# Patient Record
Sex: FEMALE | Race: WHITE | NOT HISPANIC OR LATINO | Employment: OTHER | URBAN - METROPOLITAN AREA
[De-identification: names, ages, dates, MRNs, and addresses within clinical notes are randomized per-mention and may not be internally consistent; named-entity substitution may affect disease eponyms.]

---

## 2017-03-29 ENCOUNTER — TRANSCRIBE ORDERS (OUTPATIENT)
Dept: PHYSICAL THERAPY | Facility: HOSPITAL | Age: 57
End: 2017-03-29

## 2017-03-29 DIAGNOSIS — M17.12 OSTEOARTHRITIS OF LEFT KNEE, UNSPECIFIED OSTEOARTHRITIS TYPE: Primary | ICD-10-CM

## 2017-04-04 ENCOUNTER — HOSPITAL ENCOUNTER (OUTPATIENT)
Dept: PHYSICAL THERAPY | Facility: HOSPITAL | Age: 57
Setting detail: THERAPIES SERIES
Discharge: HOME | End: 2017-04-04
Attending: SPECIALIST

## 2017-04-04 DIAGNOSIS — M17.12 OSTEOARTHRITIS OF LEFT KNEE, UNSPECIFIED OSTEOARTHRITIS TYPE: Primary | ICD-10-CM

## 2017-04-04 PROCEDURE — G8978 MOBILITY CURRENT STATUS: HCPCS | Performed by: PHYSICAL THERAPIST

## 2017-04-04 PROCEDURE — 97110 THERAPEUTIC EXERCISES: CPT | Performed by: PHYSICAL THERAPIST

## 2017-04-04 PROCEDURE — G8979 MOBILITY GOAL STATUS: HCPCS | Performed by: PHYSICAL THERAPIST

## 2017-04-04 PROCEDURE — 97161 PT EVAL LOW COMPLEX 20 MIN: CPT | Performed by: PHYSICAL THERAPIST

## 2017-04-04 NOTE — PROGRESS NOTES
Outpatient Physical Therapy Ortho Initial Evaluation  [facility]     Patient Name: Christie Hollis  : 1960  MRN: [id number]  Today's Date: 2017      Visit Date: 2017    There is no problem list on file for this patient.       History reviewed. No pertinent past medical history.     History reviewed. No pertinent surgical history.    Visit Dx:     ICD-10-CM ICD-9-CM   1. Osteoarthritis of left knee, unspecified osteoarthritis type M17.9 715.96             Patient History       17 1300          History    Chief Complaint Pain;Muscle tenderness  -LC      Type of Pain Knee pain   LEFT  -      Date Current Problem(s) Began 17  -      Brief Description of Current Complaint Pt reports that 6 weeks ago she was working out at the gym and began having knee pain. She then walked home and her pain increased. She reports sore achey pain in her Left lateral and posterior knee that has somewhat relieved, but continues to persist. She recently received a cortizone shot, but this had no change on her pain. Pt reports crepitus when standing from chair.  -LC      Previous treatment for THIS PROBLEM Injections  -LC      Onset Date- PT 17  -      Patient/Caregiver Goals Relieve pain;Return to prior level of function;Improve mobility;Improve strength  -LC      Occupation/sports/leisure activities works out at gym  -      What clinical tests have you had for this problem? X-ray  -      Results of Clinical Tests OA in LEFT knee  -LC      Pain     Pain Location Knee   LEFT; lateral and posterior joint line  -LC      Pain at Present 2  -LC      Pain at Best 2  -LC      Pain at Worst 8  -LC      Pain Frequency Constant/continuous  -LC      Pain Description Aching;Sore  -LC      What Performance Factors Make the Current Problem(s) WORSE? prolonged standing, walking  -      What Performance Factors Make the Current Problem(s) BETTER? ice  -LC      Difficulties with recreational activities?  working out at gym  -LC      Fall Risk Assessment    Any falls in the past year: No  -LC      Daily Activities    Primary Language English  -LC      Pt Participated in POC and Goals Yes  -LC      Safety    Are you being hurt, hit, or frightened by anyone at home or in your life? No  -LC      Are you being neglected by a caregiver No  -LC        User Key  (r) = Recorded By, (t) = Taken By, (c) = Cosigned By    Initials Name Provider Type    MARCELINO Florentino, PT DPT Physical Therapist                PT Ortho       04/04/17 1300    Posture/Observations    Posture- WNL Posture is WNL  -LC    Sensation    Sensation WNL? WNL  -LC    Special Tests/Palpation    Special Tests/Palpation Hip  -LC    Knee Special Tests    Lachman’s (ACL lesion) Left:;Negative  -LC    Valgus stress (MCL lesion) Left:;Negative  -LC    Varus stress (LCL lesion) Left:;Negative  -LC    Patellar grind test (chondromalacia patella) Left:;Negative  -LC    ROM (Range of Motion)    General ROM Detail 90/90 test positive  -LC    Left Knee    Extension/Flexion ROM Details 0-105; painful  -LC    Right Knee    Extension/Flexion ROM Details 0-120  -LC    Left Hip    Hip Flexion Gross Movement (4-/5) good minus  -LC    Hip ABduction Gross Movement (4-/5) good minus  -LC    Hip Abduction Gluteus Minimus (4-/5) good minus  -LC    Right Hip    Hip Flexion Gross Movement (4+/5) good plus  -LC    Hip ABduction Gross Movement (4+/5) good plus  -LC    Hip ADduction Gross Movement (4+/5) good plus  -LC    Left Knee    Knee Extension Gross Movement (4-/5) good minus  -LC    Knee Flexion Gross Movement (4-/5) good minus  -LC    Right Knee    Knee Extension Gross Movement (4+/5) good plus  -LC    Knee Flexion Gross Movement (4+/5) good plus  -LC      User Key  (r) = Recorded By, (t) = Taken By, (c) = Cosigned By    Initials Name Provider Type    MARCELINO Florentino, PT TAHMINAT Physical Therapist                            Therapy Education       04/04/17 1424          Therapy  Education    Given HEP;Symptoms/condition management;Pain management  -      Program New  -      How Provided Verbal;Demonstration;Written  -      Provided to Patient  -      Level of Understanding Teach back education performed;Verbalized;Demonstrated  -        User Key  (r) = Recorded By, (t) = Taken By, (c) = Cosigned By    Initials Name Provider Type    MARCELINO Florentino, PT DPT Physical Therapist                PT OP Goals       04/04/17 1300       PT Short Term Goals    STG 1 Pt will increase Knee Outcome Survey 20% to increase independence and performance of ADL's  -     STG 1 Progress New  -LC     STG 2 Patient will increase knee strength to 4+/5 to improve functional mobility  -     STG 2 Progress New  -     STG 3 Patient will report decreased pain rating on VAS from  5/10 to  2/10 with ADLs and household activities.  -     STG 3 Progress New  -     Long Term Goals    LTG 1 Pt will increase Knee Outcome Survey 40% to increase independence and performance of ADL's  -     LTG 1 Progress New  -     LTG 2 Patient will demonstrate an independent HEP for core and knee strength and flexibility/ROM.  -     LTG 2 Progress New  -LC     LTG 3 Pt will return to workout with reported pain 0/10.  -     LTG 3 Progress New  -     Time Calculation    PT Goal Re-Cert Due Date 05/04/17  -       User Key  (r) = Recorded By, (t) = Taken By, (c) = Cosigned By    Initials Name Provider Type    MARCELINO Florentino, PT DPT Physical Therapist                PT Assessment/Plan       04/04/17 1423       PT Assessment    Functional Limitations Performance in sport activities;Impaired gait;Performance in leisure activities  -     Impairments Gait;Pain;Joint mobility;Muscle strength;Range of motion  -     Assessment Comments Pt is a 57 y.o. female who reports to PT with acute L knee pain at the lateral and posterior joint line. Pt has dx of Left knee OA. She has a positive 90/90 test, a negative Lachman,  a negative Valgus and varus stress test. Pt presents with point tenderness over insertion of biceps femoris in posterior knee. Pt appears to have acute biceps femoris strain 6 weeks ago at gym and this has not resolved since and presents as tendinitis at this time. Pt will benefit from strengthening and stretching of hamstring and glut musculature to allow for pain reduction and return to gym workouts pain free.  -     Please refer to paper survey for additional self-reported information Yes  -     Rehab Potential Good  -     Patient would benefit from skilled therapy intervention Yes  -LC     PT Plan    PT Frequency 1x/week  -     Predicted Duration of Therapy Intervention (days/wks) 4 weeks  -     Planned CPT's? PT EVAL LOW COMPLEXITY: 07460;PT THER PROC EA 15 MIN: 89213;PT THER ACT EA 15 MIN: 77814;PT MANUAL THERAPY EA 15 MIN: 53441;PT GAIT TRAINING EA 15 MIN: 53369;PT ULTRASOUND EA 15 MIN: 68716  -     Physical Therapy Interventions (Optional Details) home exercise program;modalities;manual therapy techniques;ROM (Range of Motion);strengthening;stretching;patient/family education  -     PT Plan Comments Pt requires skilled therapy to decrease acute knee pain and allow return to sport. Pt requires skilled therapy including therex, manual, neuromuscular re-ed, and ultrasound for pain reduction and strengthening.  -       User Key  (r) = Recorded By, (t) = Taken By, (c) = Cosigned By    Initials Name Provider Type    MARCELINO Florentino PT DPTHOR Physical Therapist                  Exercises       04/04/17 1400          Exercise 1    Exercise Name 1 see exercise flowsheet  -        User Key  (r) = Recorded By, (t) = Taken By, (c) = Cosigned By    Initials Name Provider Type    MARCELINO Florentino PT DPT Physical Therapist                              Outcome Measures       04/04/17 1300          Knee Outcome Score    Knee Outcome Score Comments 55%   100% no deficits  -      Functional Assessment     Outcome Measure Options Knee Outcome Score- ADL  -LC        User Key  (r) = Recorded By, (t) = Taken By, (c) = Cosigned By    Initials Name Provider Type    MARCELINO Florentino, PT DPT Physical Therapist            Time Calculation:   Start Time: 1335  Stop Time: 1413  Time Calculation (min): 38 min     Therapy Charges for Today     Code Description Service Date Service Provider Modifiers Qty    60758635359 HC PT MOBILITY CURRENT 4/4/2017 Jemal Florentino, PT DPT GP, CK 1    02904174581 HC PT MOBILITY PROJECTED 4/4/2017 Jemal Florentino, PT DPT GP, CI 1    90647440068 HC PT EVAL LOW COMPLEXITY 2 4/4/2017 Jemal Florentino, PT DPT GP 1    89237095781 HC PT THER PROC EA 15 MIN 4/4/2017 Jemal Florentino PT DPT GP 1          PT G-Codes  PT Professional Judgement Used?: Yes  Outcome Measure Options: Knee Outcome Score- ADL  Score: 55%  Functional Limitation: Mobility: Walking and moving around  Mobility: Walking and Moving Around Current Status (): At least 40 percent but less than 60 percent impaired, limited or restricted  Mobility: Walking and Moving Around Goal Status (): At least 1 percent but less than 20 percent impaired, limited or restricted         Jemal Florentino PT DPT  4/4/2017

## 2017-04-13 ENCOUNTER — HOSPITAL ENCOUNTER (OUTPATIENT)
Dept: PHYSICAL THERAPY | Facility: HOSPITAL | Age: 57
Setting detail: THERAPIES SERIES
Discharge: HOME | End: 2017-04-13
Attending: SPECIALIST

## 2017-04-13 DIAGNOSIS — M17.12 OSTEOARTHRITIS OF LEFT KNEE, UNSPECIFIED OSTEOARTHRITIS TYPE: Primary | ICD-10-CM

## 2017-04-13 PROCEDURE — 97110 THERAPEUTIC EXERCISES: CPT | Performed by: PHYSICAL THERAPIST

## 2017-04-13 NOTE — PROGRESS NOTES
Outpatient Physical Therapy Ortho Treatment Note  [facility]     Patient Name: Christie Hollis  : 1960  MRN: [id number]  Today's Date: 2017      Visit Date: 2017    Visit Dx:    ICD-10-CM ICD-9-CM   1. Osteoarthritis of left knee, unspecified osteoarthritis type M17.9 715.96       There is no problem list on file for this patient.       No past medical history on file.     No past surgical history on file.          PT Ortho       17 1400    Subjective Comments    Subjective Comments Pt reports that her knee pain on average during the day is a 1/10. She says that her B knees feel much better and she can tell a large difference in her ability to tolerate walking for sustained period of time with 0/10 knee pain. Pt reports her chief complaint at ths time is her R LBP which is on average about a 5-6/10.   -    Subjective Pain    Able to rate subjective pain? yes  -    Pre-Treatment Pain Level 1  -    Post-Treatment Pain Level 0  -      User Key  (r) = Recorded By, (t) = Taken By, (c) = Cosigned By    Initials Name Provider Type    MARCELINO Florentino, PT DPT Physical Therapist                            PT Assessment/Plan       17 1511       PT Assessment    Functional Limitations Performance in sport activities;Impaired gait;Performance in leisure activities  -     Impairments Gait;Pain;Joint mobility;Muscle strength;Range of motion  -     Assessment Comments Pt reports B knee pain no greater than 1/10 throughout the day. She reports that she walked 8,000 steps the other day with pain no greater than 1/10. She progressed to dynamic standing therex today including deadlifts and squats with weight. Pt reports LBP L > R 6/10 at worst during the day. She will continue to be progress to more functional therex specifically stair training to reduce pain when climbing stairs.  -     PT Plan    PT Plan Comments Continue skilled therapy intervention to promote hip and knee  instability and prevent risk of reinjury.  -       User Key  (r) = Recorded By, (t) = Taken By, (c) = Cosigned By    Initials Name Provider Type    MARCELINO Florentino, PT DPT Physical Therapist                    Exercises       04/13/17 1400          Subjective Comments    Subjective Comments Pt reports that her knee pain on average during the day is a 1/10. She says that her B knees feel much better and she can tell a large difference in her ability to tolerate walking for sustained period of time with 0/10 knee pain. Pt reports her chief complaint at ths time is her R LBP which is on average about a 5-6/10.   -LC      Subjective Pain    Able to rate subjective pain? yes  -LC      Pre-Treatment Pain Level 1  -LC      Post-Treatment Pain Level 0  -LC      Exercise 1    Exercise Name 1 see exercise flowsheet  -        User Key  (r) = Recorded By, (t) = Taken By, (c) = Cosigned By    Initials Name Provider Type    MARCELINO Florentino, PT DPT Physical Therapist                               PT OP Goals       04/13/17 1500       PT Short Term Goals    STG 1 Pt will increase Knee Outcome Survey 20% to increase independence and performance of ADL's  -     STG 1 Progress Progressing  -     STG 2 Patient will increase knee strength to 4+/5 to improve functional mobility  -     STG 2 Progress Progressing  -     STG 3 Patient will report decreased pain rating on VAS from  5/10 to  2/10 with ADLs and household activities.  -     STG 3 Progress Met  -     Long Term Goals    LTG 1 Pt will increase Knee Outcome Survey 40% to increase independence and performance of ADL's  -     LTG 1 Progress Progressing  -     LTG 2 Patient will demonstrate an independent HEP for core and knee strength and flexibility/ROM.  -     LTG 2 Progress Met  -     LTG 3 Pt will return to workout with reported pain 0/10.  -     LTG 3 Progress Progressing  -       User Key  (r) = Recorded By, (t) = Taken By, (c) = Cosigned By     Initials Name Provider Type    MARCELINO Florentino PT DPT Physical Therapist                Therapy Education       04/13/17 1507          Therapy Education    Given HEP;Symptoms/condition management;Pain management  -LC      Program Reinforced  -LC      How Provided Verbal;Demonstration;Written  -LC      Provided to Patient  -LC      Level of Understanding Teach back education performed;Verbalized;Demonstrated  -LC        User Key  (r) = Recorded By, (t) = Taken By, (c) = Cosigned By    Initials Name Provider Type    REYNA TownsendT Physical Therapist                Time Calculation:   Start Time: 1430  Stop Time: 1510  Time Calculation (min): 40 min    Therapy Charges for Today     Code Description Service Date Service Provider Modifiers Qty    56251560898 HC PT THER PROC EA 15 MIN 4/13/2017 Jemal Florentino PT DPT GP 1                    Jemal Florentino PT DPT  4/13/2017

## 2017-04-21 ENCOUNTER — HOSPITAL ENCOUNTER (OUTPATIENT)
Dept: PHYSICAL THERAPY | Facility: HOSPITAL | Age: 57
Setting detail: THERAPIES SERIES
Discharge: HOME | End: 2017-04-21
Attending: SPECIALIST

## 2017-04-21 DIAGNOSIS — M17.12 OSTEOARTHRITIS OF LEFT KNEE, UNSPECIFIED OSTEOARTHRITIS TYPE: Primary | ICD-10-CM

## 2017-04-21 PROCEDURE — G8979 MOBILITY GOAL STATUS: HCPCS | Performed by: PHYSICAL THERAPIST

## 2017-04-21 PROCEDURE — 97110 THERAPEUTIC EXERCISES: CPT | Performed by: PHYSICAL THERAPIST

## 2017-04-21 PROCEDURE — G8980 MOBILITY D/C STATUS: HCPCS | Performed by: PHYSICAL THERAPIST

## 2017-04-21 NOTE — THERAPY DISCHARGE NOTE
Outpatient Physical Therapy Ortho Treatment Note/Discharge Summary  [facility]     Patient Name: Christie Hollis  : 1960  MRN: [id number]  Today's Date: 2017      Visit Date: 2017    Visit Dx:    ICD-10-CM ICD-9-CM   1. Osteoarthritis of left knee, unspecified osteoarthritis type M17.9 715.96       There is no problem list on file for this patient.       No past medical history on file.     No past surgical history on file.          PT Ortho       17 1100    Subjective Comments    Subjective Comments Pt reports pain 1/10 typically throughout the day. She has pain up to 4/10 at worst during the day which is general soreness after overdoing activity. She is compliant with her HEP and reports that she can tell she is continuing to improve. She is confident she can self-manage her pain and wants this to be her last visit and D/C home to perform HEP independently. Pt reports 90% improvement since her initial evaluation and beginning her HEP.  -LC    Subjective Pain    Able to rate subjective pain? yes  -LC    Pre-Treatment Pain Level 1  -LC    Post-Treatment Pain Level 0  -LC    ROM (Range of Motion)    General ROM Detail 90/90 test negative  -LC    Left Knee    Extension/Flexion ROM Details 0-120  -LC    Left Hip    Hip Flexion Gross Movement (4+/5) good plus  -LC    Hip ABduction Gross Movement (4+/5) good plus  -LC    Hip Abduction Gluteus Minimus (4+/5) good plus  -LC    Left Knee    Knee Extension Gross Movement (4+/5) good plus  -LC    Knee Flexion Gross Movement (4+/5) good plus  -LC      User Key  (r) = Recorded By, (t) = Taken By, (c) = Cosigned By    Initials Name Provider Type    MARCELINO Florentino, PT DPT Physical Therapist                            PT Assessment/Plan       17 1205       PT Assessment    Assessment Comments Pt has 0-120 pain free AROM of L knee. She demonstrates 4+/5 L knee flex/ext MMT. Pt reports pain no greater than 1/10 typically in the day and at  worst soreness 4/10. Pt reported a Knee outcome survey score of 11% disability reported. She is returning to the gym to continue working out as she did prior to her injury. She says she is 90% better since her initial evaluation. Pt instructed in HEP and to contact PT if any change in status or questions regarding HEP.  -     PT Plan    PT Plan Comments Pt will be D/C'd home with HEP.  -       User Key  (r) = Recorded By, (t) = Taken By, (c) = Cosigned By    Initials Name Provider Type    MARCELINO Florentino, PT DPT Physical Therapist                    Exercises       04/21/17 1100          Subjective Comments    Subjective Comments Pt reports pain 1/10 typically throughout the day. She has pain up to 4/10 at worst during the day which is general soreness after overdoing activity. She is compliant with her HEP and reports that she can tell she is continuing to improve. She is confident she can self-manage her pain and wants this to be her last visit and D/C home to perform HEP independently. Pt reports 90% improvement since her initial evaluation and beginning her HEP.  -      Subjective Pain    Able to rate subjective pain? yes  -      Pre-Treatment Pain Level 1  -      Post-Treatment Pain Level 0  -      Exercise 1    Exercise Name 1 see exercise flowsheet  -        User Key  (r) = Recorded By, (t) = Taken By, (c) = Cosigned By    Initials Name Provider Type    MARCELINO Florentino, PT DPT Physical Therapist                               PT OP Goals       04/21/17 1200       PT Short Term Goals    STG 1 Pt will increase Knee Outcome Survey 20% to increase independence and performance of ADL's  -     STG 1 Progress Met  -     STG 2 Patient will increase knee strength to 4+/5 to improve functional mobility  -     STG 2 Progress Met  -     STG 3 Patient will report decreased pain rating on VAS from  5/10 to  2/10 with ADLs and household activities.  -     STG 3 Progress Partially Met  -     Long  Term Goals    LTG 1 Pt will increase Knee Outcome Survey 40% to increase independence and performance of ADL's  -LC     LTG 1 Progress Partially Met  -LC     LTG 2 Patient will demonstrate an independent HEP for core and knee strength and flexibility/ROM.  -LC     LTG 2 Progress Met  -LC     LTG 3 Pt will return to workout with reported pain 0/10.  -LC     LTG 3 Progress Not Met  -LC       User Key  (r) = Recorded By, (t) = Taken By, (c) = Cosigned By    Initials Name Provider Type    MARCELINO Florentino, PT DPT Physical Therapist                Therapy Education       04/21/17 1204          Therapy Education    Given HEP;Symptoms/condition management;Pain management  -LC      Program Reinforced  -LC      How Provided Verbal;Demonstration;Written  -LC      Provided to Patient  -LC      Level of Understanding Teach back education performed;Verbalized;Demonstrated  -LC        User Key  (r) = Recorded By, (t) = Taken By, (c) = Cosigned By    Initials Name Provider Type    MARCELINO Florentino, PT DPT Physical Therapist                Outcome Measures       04/21/17 1200          Knee Outcome Score    Knee Outcome Score Comments 89%; 100% no deficits  -LC      Functional Assessment    Outcome Measure Options Knee Outcome Score- ADL  -        User Key  (r) = Recorded By, (t) = Taken By, (c) = Cosigned By    Initials Name Provider Type    MARCELINO Florentino, PT DPT Physical Therapist            Time Calculation:   Start Time: 1120  Stop Time: 1145  Time Calculation (min): 25 min    Therapy Charges for Today     Code Description Service Date Service Provider Modifiers Qty    00810418561 HC PT MOBILITY PROJECTED 4/21/2017 Jemal Florentino, PT DPT GP, CI 1    24120882058 HC PT MOBILITY DISCHARGE 4/21/2017 Jemal Florentino, PT DPT GP, CI 1    81964851668 HC PT THER PROC EA 15 MIN 4/21/2017 Jemal Florentino, PT DPT GP 2          PT G-Codes  PT Professional Judgement Used?: Yes  Outcome Measure Options: Knee Outcome Score- ADL  Score:  89%  Functional Limitation: Mobility: Walking and moving around  Mobility: Walking and Moving Around Goal Status (): At least 1 percent but less than 20 percent impaired, limited or restricted  Mobility: Walking and Moving Around Discharge Status (): At least 1 percent but less than 20 percent impaired, limited or restricted     OP PT Discharge Summary  Date of Discharge: 04/21/17  Reason for Discharge: All goals achieved, Independent  Outcomes Achieved: Refer to plan of care for updates on goals achieved, Patient able to partially acheive established goals  Discharge Destination: Home with home program  Discharge Instructions: Pt instructed to contact PT if any change in status or any questions regarding HEP.      Jemal Florentino, PT DPT  4/21/2017

## 2017-06-12 ENCOUNTER — DOCUMENTATION (OUTPATIENT)
Dept: PHYSICAL THERAPY | Facility: HOSPITAL | Age: 57
End: 2017-06-12

## 2017-10-09 ENCOUNTER — TRANSCRIBE ORDERS (OUTPATIENT)
Dept: ADMINISTRATIVE | Facility: HOSPITAL | Age: 57
End: 2017-10-09

## 2017-10-09 DIAGNOSIS — Z12.31 SCREENING MAMMOGRAM, ENCOUNTER FOR: Primary | ICD-10-CM

## 2017-11-20 ENCOUNTER — HOSPITAL ENCOUNTER (OUTPATIENT)
Dept: MAMMOGRAPHY | Facility: HOSPITAL | Age: 57
Discharge: HOME | End: 2017-11-20
Admitting: INTERNAL MEDICINE

## 2017-11-20 DIAGNOSIS — Z12.31 SCREENING MAMMOGRAM, ENCOUNTER FOR: ICD-10-CM

## 2017-11-20 PROCEDURE — 77063 BREAST TOMOSYNTHESIS BI: CPT

## 2017-11-20 PROCEDURE — G0202 SCR MAMMO BI INCL CAD: HCPCS

## 2018-11-01 ENCOUNTER — TRANSCRIBE ORDERS (OUTPATIENT)
Dept: ADMINISTRATIVE | Facility: HOSPITAL | Age: 58
End: 2018-11-01

## 2018-11-01 DIAGNOSIS — Z78.0 POSTMENOPAUSAL: ICD-10-CM

## 2018-11-01 DIAGNOSIS — Z13.820 SCREENING FOR OSTEOPOROSIS: ICD-10-CM

## 2018-11-01 DIAGNOSIS — Z12.39 SCREENING BREAST EXAMINATION: Primary | ICD-10-CM

## 2018-12-04 ENCOUNTER — HOSPITAL ENCOUNTER (OUTPATIENT)
Dept: MAMMOGRAPHY | Facility: HOSPITAL | Age: 58
Discharge: HOME | End: 2018-12-04
Admitting: INTERNAL MEDICINE

## 2018-12-04 ENCOUNTER — HOSPITAL ENCOUNTER (OUTPATIENT)
Dept: BONE DENSITY | Facility: HOSPITAL | Age: 58
Discharge: HOME | End: 2018-12-04

## 2018-12-04 DIAGNOSIS — Z12.39 SCREENING BREAST EXAMINATION: ICD-10-CM

## 2018-12-04 DIAGNOSIS — Z78.0 POSTMENOPAUSAL: ICD-10-CM

## 2018-12-04 DIAGNOSIS — Z13.820 SCREENING FOR OSTEOPOROSIS: ICD-10-CM

## 2018-12-04 PROCEDURE — 77063 BREAST TOMOSYNTHESIS BI: CPT

## 2018-12-04 PROCEDURE — 77067 SCR MAMMO BI INCL CAD: CPT

## 2018-12-04 PROCEDURE — 77080 DXA BONE DENSITY AXIAL: CPT

## 2019-06-07 ENCOUNTER — TRANSCRIBE ORDERS (OUTPATIENT)
Dept: ADMINISTRATIVE | Facility: HOSPITAL | Age: 59
End: 2019-06-07

## 2019-06-07 DIAGNOSIS — E04.1 THYROID NODULE: Primary | ICD-10-CM

## 2019-06-13 ENCOUNTER — HOSPITAL ENCOUNTER (OUTPATIENT)
Dept: ULTRASOUND IMAGING | Facility: HOSPITAL | Age: 59
Discharge: HOME | End: 2019-06-13
Admitting: NURSE PRACTITIONER

## 2019-06-13 DIAGNOSIS — E04.1 THYROID NODULE: ICD-10-CM

## 2019-06-13 PROCEDURE — 76536 US EXAM OF HEAD AND NECK: CPT

## 2019-09-19 ENCOUNTER — HOSPITAL ENCOUNTER (OUTPATIENT)
Dept: GENERAL RADIOLOGY | Facility: HOSPITAL | Age: 59
Discharge: HOME | End: 2019-09-19
Admitting: INTERNAL MEDICINE

## 2019-09-19 DIAGNOSIS — R05.9 COUGH: ICD-10-CM

## 2019-09-19 PROCEDURE — 71046 X-RAY EXAM CHEST 2 VIEWS: CPT

## 2019-11-11 ENCOUNTER — TRANSCRIBE ORDERS (OUTPATIENT)
Dept: ADMINISTRATIVE | Facility: HOSPITAL | Age: 59
End: 2019-11-11

## 2019-11-11 DIAGNOSIS — Z12.39 BREAST CANCER SCREENING: Primary | ICD-10-CM

## 2019-12-17 ENCOUNTER — HOSPITAL ENCOUNTER (OUTPATIENT)
Dept: MAMMOGRAPHY | Facility: HOSPITAL | Age: 59
Discharge: HOME | End: 2019-12-17
Admitting: INTERNAL MEDICINE

## 2019-12-17 DIAGNOSIS — Z12.39 BREAST CANCER SCREENING: ICD-10-CM

## 2019-12-17 PROCEDURE — 77067 SCR MAMMO BI INCL CAD: CPT

## 2019-12-17 PROCEDURE — 77063 BREAST TOMOSYNTHESIS BI: CPT

## 2020-11-03 ENCOUNTER — TRANSCRIBE ORDERS (OUTPATIENT)
Dept: ADMINISTRATIVE | Facility: HOSPITAL | Age: 60
End: 2020-11-03

## 2020-11-03 DIAGNOSIS — Z12.31 VISIT FOR SCREENING MAMMOGRAM: Primary | ICD-10-CM

## 2021-02-11 ENCOUNTER — HOSPITAL ENCOUNTER (OUTPATIENT)
Dept: MAMMOGRAPHY | Facility: HOSPITAL | Age: 61
Discharge: HOME | End: 2021-02-11
Admitting: INTERNAL MEDICINE

## 2021-02-11 DIAGNOSIS — Z12.31 VISIT FOR SCREENING MAMMOGRAM: ICD-10-CM

## 2021-02-11 PROCEDURE — 77067 SCR MAMMO BI INCL CAD: CPT

## 2021-02-11 PROCEDURE — 77063 BREAST TOMOSYNTHESIS BI: CPT

## 2021-03-05 ENCOUNTER — HOSPITAL ENCOUNTER (OUTPATIENT)
Dept: ULTRASOUND IMAGING | Facility: HOSPITAL | Age: 61
End: 2021-03-05

## 2021-03-05 ENCOUNTER — HOSPITAL ENCOUNTER (OUTPATIENT)
Dept: MAMMOGRAPHY | Facility: HOSPITAL | Age: 61
Discharge: HOME | End: 2021-03-05
Admitting: INTERNAL MEDICINE

## 2021-03-05 DIAGNOSIS — R92.8 ABNORMAL MAMMOGRAM: ICD-10-CM

## 2021-03-05 PROCEDURE — G0279 TOMOSYNTHESIS, MAMMO: HCPCS

## 2021-03-05 PROCEDURE — 77065 DX MAMMO INCL CAD UNI: CPT

## 2021-03-22 ENCOUNTER — BULK ORDERING (OUTPATIENT)
Dept: CASE MANAGEMENT | Facility: OTHER | Age: 61
End: 2021-03-22

## 2021-03-22 DIAGNOSIS — Z23 IMMUNIZATION DUE: ICD-10-CM

## 2022-02-22 ENCOUNTER — TRANSCRIBE ORDERS (OUTPATIENT)
Dept: ADMINISTRATIVE | Facility: HOSPITAL | Age: 62
End: 2022-02-22

## 2022-02-22 DIAGNOSIS — Z78.0 MENOPAUSE: ICD-10-CM

## 2022-02-22 DIAGNOSIS — Z12.31 VISIT FOR SCREENING MAMMOGRAM: Primary | ICD-10-CM

## 2022-08-26 ENCOUNTER — HOSPITAL ENCOUNTER (OUTPATIENT)
Dept: MAMMOGRAPHY | Facility: HOSPITAL | Age: 62
Discharge: HOME | End: 2022-08-26

## 2022-08-26 ENCOUNTER — HOSPITAL ENCOUNTER (OUTPATIENT)
Dept: BONE DENSITY | Facility: HOSPITAL | Age: 62
Discharge: HOME | End: 2022-08-26

## 2022-08-26 DIAGNOSIS — Z12.31 VISIT FOR SCREENING MAMMOGRAM: ICD-10-CM

## 2022-08-26 DIAGNOSIS — Z78.0 MENOPAUSE: ICD-10-CM

## 2022-08-26 PROCEDURE — 77080 DXA BONE DENSITY AXIAL: CPT

## 2022-08-26 PROCEDURE — 77067 SCR MAMMO BI INCL CAD: CPT

## 2022-08-26 PROCEDURE — 77063 BREAST TOMOSYNTHESIS BI: CPT

## 2023-08-28 ENCOUNTER — TRANSCRIBE ORDERS (OUTPATIENT)
Dept: ADMINISTRATIVE | Facility: HOSPITAL | Age: 63
End: 2023-08-28
Payer: COMMERCIAL

## 2023-08-28 DIAGNOSIS — Z12.39 SCREENING BREAST EXAMINATION: Primary | ICD-10-CM

## 2023-09-12 ENCOUNTER — HOSPITAL ENCOUNTER (OUTPATIENT)
Dept: MAMMOGRAPHY | Facility: HOSPITAL | Age: 63
Discharge: HOME | End: 2023-09-12
Admitting: INTERNAL MEDICINE
Payer: COMMERCIAL

## 2023-09-12 DIAGNOSIS — Z12.39 SCREENING BREAST EXAMINATION: ICD-10-CM

## 2023-09-12 PROCEDURE — 77067 SCR MAMMO BI INCL CAD: CPT

## 2023-09-12 PROCEDURE — 77063 BREAST TOMOSYNTHESIS BI: CPT

## 2023-10-06 ENCOUNTER — HOSPITAL ENCOUNTER (OUTPATIENT)
Dept: GENERAL RADIOLOGY | Facility: HOSPITAL | Age: 63
Discharge: HOME | End: 2023-10-06
Payer: COMMERCIAL

## 2023-10-06 ENCOUNTER — PRE-ADMISSION TESTING (OUTPATIENT)
Dept: PREADMISSION TESTING | Facility: HOSPITAL | Age: 63
End: 2023-10-06
Payer: COMMERCIAL

## 2023-10-06 VITALS
RESPIRATION RATE: 18 BRPM | DIASTOLIC BLOOD PRESSURE: 76 MMHG | TEMPERATURE: 97.6 F | SYSTOLIC BLOOD PRESSURE: 157 MMHG | WEIGHT: 200 LBS | HEIGHT: 63 IN | HEART RATE: 91 BPM | BODY MASS INDEX: 35.44 KG/M2 | OXYGEN SATURATION: 97 %

## 2023-10-06 LAB
ALBUMIN SERPL-MCNC: 4.5 G/DL (ref 3.5–5.2)
ALBUMIN/GLOB SERPL: 2.4 G/DL
ALP SERPL-CCNC: 49 U/L (ref 39–117)
ALT SERPL W P-5'-P-CCNC: 11 U/L (ref 1–33)
ANION GAP SERPL CALCULATED.3IONS-SCNC: 10.7 MMOL/L (ref 5–15)
AST SERPL-CCNC: 11 U/L (ref 1–32)
BILIRUB SERPL-MCNC: 0.2 MG/DL (ref 0–1.2)
BILIRUB UR QL STRIP: NEGATIVE
BUN SERPL-MCNC: 8 MG/DL (ref 8–23)
BUN/CREAT SERPL: 11.9 (ref 7–25)
CALCIUM SPEC-SCNC: 9.5 MG/DL (ref 8.6–10.5)
CHLORIDE SERPL-SCNC: 103 MMOL/L (ref 98–107)
CLARITY UR: CLEAR
CO2 SERPL-SCNC: 24.3 MMOL/L (ref 22–29)
COLOR UR: YELLOW
CREAT SERPL-MCNC: 0.67 MG/DL (ref 0.57–1)
DEPRECATED RDW RBC AUTO: 41.1 FL (ref 37–54)
EGFRCR SERPLBLD CKD-EPI 2021: 97.7 ML/MIN/1.73
ERYTHROCYTE [DISTWIDTH] IN BLOOD BY AUTOMATED COUNT: 13.3 % (ref 12.3–15.4)
GLOBULIN UR ELPH-MCNC: 1.9 GM/DL
GLUCOSE SERPL-MCNC: 148 MG/DL (ref 65–99)
GLUCOSE UR STRIP-MCNC: NEGATIVE MG/DL
HBA1C MFR BLD: 6.5 % (ref 4.8–5.6)
HCT VFR BLD AUTO: 36.8 % (ref 34–46.6)
HGB BLD-MCNC: 12.1 G/DL (ref 12–15.9)
HGB UR QL STRIP.AUTO: NEGATIVE
INR PPP: 0.93 (ref 0.9–1.1)
KETONES UR QL STRIP: NEGATIVE
LEUKOCYTE ESTERASE UR QL STRIP.AUTO: NEGATIVE
MCH RBC QN AUTO: 28.3 PG (ref 26.6–33)
MCHC RBC AUTO-ENTMCNC: 32.9 G/DL (ref 31.5–35.7)
MCV RBC AUTO: 86 FL (ref 79–97)
NITRITE UR QL STRIP: NEGATIVE
PH UR STRIP.AUTO: 6.5 [PH] (ref 5–8)
PLATELET # BLD AUTO: 363 10*3/MM3 (ref 140–450)
PMV BLD AUTO: 9 FL (ref 6–12)
POTASSIUM SERPL-SCNC: 4.2 MMOL/L (ref 3.5–5.2)
PROT SERPL-MCNC: 6.4 G/DL (ref 6–8.5)
PROT UR QL STRIP: NEGATIVE
PROTHROMBIN TIME: 12.6 SECONDS (ref 11.7–14.2)
QT INTERVAL: 359 MS
QTC INTERVAL: 420 MS
RBC # BLD AUTO: 4.28 10*6/MM3 (ref 3.77–5.28)
SODIUM SERPL-SCNC: 138 MMOL/L (ref 136–145)
SP GR UR STRIP: 1.01 (ref 1–1.03)
UROBILINOGEN UR QL STRIP: NORMAL
WBC NRBC COR # BLD: 7.56 10*3/MM3 (ref 3.4–10.8)

## 2023-10-06 PROCEDURE — 73560 X-RAY EXAM OF KNEE 1 OR 2: CPT

## 2023-10-06 PROCEDURE — 80053 COMPREHEN METABOLIC PANEL: CPT

## 2023-10-06 PROCEDURE — 93005 ELECTROCARDIOGRAM TRACING: CPT

## 2023-10-06 PROCEDURE — 83036 HEMOGLOBIN GLYCOSYLATED A1C: CPT

## 2023-10-06 PROCEDURE — 85610 PROTHROMBIN TIME: CPT

## 2023-10-06 PROCEDURE — 36415 COLL VENOUS BLD VENIPUNCTURE: CPT

## 2023-10-06 PROCEDURE — 81003 URINALYSIS AUTO W/O SCOPE: CPT

## 2023-10-06 PROCEDURE — 71046 X-RAY EXAM CHEST 2 VIEWS: CPT

## 2023-10-06 PROCEDURE — 85027 COMPLETE CBC AUTOMATED: CPT

## 2023-10-06 RX ORDER — PIOGLITAZONEHYDROCHLORIDE 45 MG/1
45 TABLET ORAL DAILY
Status: ON HOLD | COMMUNITY

## 2023-10-06 RX ORDER — VALSARTAN 160 MG/1
160 TABLET ORAL DAILY
Status: ON HOLD | COMMUNITY

## 2023-10-06 RX ORDER — VITAMIN E 268 MG
CAPSULE ORAL
Status: ON HOLD | COMMUNITY

## 2023-10-06 RX ORDER — ROSUVASTATIN CALCIUM 20 MG/1
20 TABLET, COATED ORAL NIGHTLY
Status: ON HOLD | COMMUNITY

## 2023-10-06 RX ORDER — MECOBALAMIN 5000 MCG
15 TABLET,DISINTEGRATING ORAL DAILY
Status: ON HOLD | COMMUNITY

## 2023-10-06 RX ORDER — LORATADINE 10 MG/1
CAPSULE, LIQUID FILLED ORAL
Status: ON HOLD | COMMUNITY

## 2023-10-06 RX ORDER — MELATONIN
1000 DAILY
Status: ON HOLD | COMMUNITY

## 2023-10-06 RX ORDER — MELOXICAM 15 MG/1
15 TABLET ORAL DAILY
Status: ON HOLD | COMMUNITY

## 2023-10-06 RX ORDER — CITALOPRAM 20 MG/1
20 TABLET ORAL DAILY
Status: ON HOLD | COMMUNITY

## 2023-10-06 RX ORDER — ASPIRIN 325 MG
325 TABLET ORAL DAILY
Status: ON HOLD | COMMUNITY

## 2023-10-06 RX ORDER — UBIDECARENONE 200 MG
CAPSULE ORAL
Status: ON HOLD | COMMUNITY

## 2023-10-06 RX ORDER — SEMAGLUTIDE 2.68 MG/ML
INJECTION, SOLUTION SUBCUTANEOUS WEEKLY
Status: ON HOLD | COMMUNITY

## 2023-10-06 NOTE — DISCHARGE INSTRUCTIONS
Take the following medications the morning of surgery:  CITALOPRAM AND LANSOPRAZOLE    YOU WILL BE NOTIFIED OF ARRIVAL TIME    If you are on prescription narcotic pain medication to control your pain you may also take that medication the morning of surgery.    General Instructions:  Do not eat solid food after midnight the night before surgery.  You may drink clear liquids day of surgery but must stop at least one hour before your hospital arrival time.  It is beneficial for you to have a clear drink that contains carbohydrates the day of surgery.  We suggest a 12 to 20 ounce bottle of Gatorade or Powerade for non-diabetic patients or a 12 to 20 ounce bottle of G2 or Powerade Zero for diabetic patients. (Pediatric patients, are not advised to drink a 12 to 20 ounce carbohydrate drink)    Clear liquids are liquids you can see through.  Nothing red in color.     Plain water                               Sports drinks  Sodas                                   Gelatin (Jell-O)  Fruit juices without pulp such as white grape juice and apple juice  Popsicles that contain no fruit or yogurt  Tea or coffee (no cream or milk added)  Gatorade / Powerade  G2 / Powerade Zero    Infants may have breast milk up to four hours before surgery.  Infants drinking formula may drink formula up to six hours before surgery.   Patients who avoid smoking, chewing tobacco and alcohol for 4 weeks prior to surgery have a reduced risk of post-operative complications.  Quit smoking as many days before surgery as you can.  Do not smoke, use chewing tobacco or drink alcohol the day of surgery.   If applicable bring your C-PAP/ BI-PAP machine in with you to preop day of surgery.  Bring any papers given to you in the doctor’s office.  Wear clean comfortable clothes.  Do not wear contact lenses, false eyelashes or make-up.  Bring a case for your glasses.   Bring crutches or walker if applicable.  Remove all piercings.  Leave jewelry and any other  valuables at home.  Hair extensions with metal clips must be removed prior to surgery.  The Pre-Admission Testing nurse will instruct you to bring medications if unable to obtain an accurate list in Pre-Admission Testing.        If you were given a blood bank ID arm band remember to bring it with you the day of surgery.    Preventing a Surgical Site Infection:  For 2 to 3 days before surgery, avoid shaving with a razor because the razor can irritate skin and make it easier to develop an infection.    Any areas of open skin can increase the risk of a post-operative wound infection by allowing bacteria to enter and travel throughout the body.  Notify your surgeon if you have any skin wounds / rashes even if it is not near the expected surgical site.  The area will need assessed to determine if surgery should be delayed until it is healed.  The night prior to surgery shower using a fresh bar of anti-bacterial soap (such as Dial) and clean washcloth.  Sleep in a clean bed with clean clothing.  Do not allow pets to sleep with you.  Shower on the morning of surgery using a fresh bar of anti-bacterial soap (such as Dial) and clean washcloth.  Dry with a clean towel and dress in clean clothing.  Ask your surgeon if you will be receiving antibiotics prior to surgery.  Make sure you, your family, and all healthcare providers clean their hands with soap and water or an alcohol based hand  before caring for you or your wound.    Day of surgery: 10/16/2023  Your arrival time is approximately two hours before your scheduled surgery time.  Upon arrival, a Pre-op nurse and Anesthesiologist will review your health history, obtain vital signs, and answer questions you may have.  The only belongings needed at this time will be a list of your home medications and if applicable your C-PAP/BI-PAP machine.  A Pre-op nurse will start an IV and you may receive medication in preparation for surgery, including something to help you  relax.     Please be aware that surgery does come with discomfort.  We want to make every effort to control your discomfort so please discuss any uncontrolled symptoms with your nurse.   Your doctor will most likely have prescribed pain medications.      If you are going home after surgery you will receive individualized written care instructions before being discharged.  A responsible adult must drive you to and from the hospital on the day of your surgery and stay with you for 24 hours.  Discharge prescriptions can be filled by the hospital pharmacy during regular pharmacy hours.  If you are having surgery late in the day/evening your prescription may be e-prescribed to your pharmacy.  Please verify your pharmacy hours or chose a 24 hour pharmacy to avoid not having access to your prescription because your pharmacy has closed for the day.    If you are staying overnight following surgery, you will be transported to your hospital room following the recovery period.  [facility] has all private rooms.    If you have any questions please call Pre-Admission Testing at [phone].  Deductibles and co-payments are collected on the day of service. Please be prepared to pay the required co-pay, deductible or deposit on the day of service as defined by your plan.    Call your surgeon immediately if you experience any of the following symptoms:  Sore Throat  Shortness of Breath or difficulty breathing  Cough  Chills  Body soreness or muscle pain  Headache  Fever  New loss of taste or smell  Do not arrive for your surgery ill.  Your procedure will need to be rescheduled to another time.  You will need to call your physician before the day of surgery to avoid any unnecessary exposure to hospital staff as well as other patients.      CHLORHEXIDINE CLOTH INSTRUCTIONS  The morning of surgery follow these instructions using the Chlorhexidine cloths you've been given.  These steps reduce bacteria on the body.  Do  not use the cloths near your eyes, ears mouth, genitalia or on open wounds.  Throw the cloths away after use but do not try to flush them down a toilet.      Open and remove one cloth at a time from the package.    Leave the cloth unfolded and begin the bathing.  Massage the skin with the cloths using gentle pressure to remove bacteria.  Do not scrub harshly.   Follow the steps below with one 2% CHG cloth per area (6 total cloths).  One cloth for neck, shoulders and chest.  One cloth for both arms, hands, fingers and underarms (do underarms last).  One cloth for the abdomen followed by groin.  One cloth for right leg and foot including between the toes.  One cloth for left leg and foot including between the toes.  The last cloth is to be used for the back of the neck, back and buttocks.    Allow the CHG to air dry 3 minutes on the skin which will give it time to work and decrease the chance of irritation.  The skin may feel sticky until it is dry.  Do not rinse with water or any other liquid or you will lose the beneficial effects of the CHG.  If mild skin irritation occurs, do rinse the skin to remove the CHG.  Report this to the nurse at time of admission.  Do not apply lotions, creams, ointments, deodorants or perfumes after using the clothes. Dress in clean clothes before coming to the hospital.

## 2023-10-16 ENCOUNTER — ANESTHESIA EVENT (OUTPATIENT)
Dept: PERIOP | Facility: HOSPITAL | Age: 63
End: 2023-10-16
Payer: COMMERCIAL

## 2023-10-16 ENCOUNTER — HOSPITAL ENCOUNTER (OUTPATIENT)
Facility: HOSPITAL | Age: 63
Setting detail: OBSERVATION
Discharge: HOME | End: 2023-10-17
Attending: ORTHOPAEDIC SURGERY | Admitting: ORTHOPAEDIC SURGERY
Payer: COMMERCIAL

## 2023-10-16 ENCOUNTER — APPOINTMENT (OUTPATIENT)
Dept: GENERAL RADIOLOGY | Facility: HOSPITAL | Age: 63
End: 2023-10-16
Payer: COMMERCIAL

## 2023-10-16 ENCOUNTER — ANESTHESIA (OUTPATIENT)
Dept: PERIOP | Facility: HOSPITAL | Age: 63
End: 2023-10-16
Payer: COMMERCIAL

## 2023-10-16 PROBLEM — E11.9 TYPE 2 DIABETES MELLITUS, WITHOUT LONG-TERM CURRENT USE OF INSULIN: Status: ACTIVE | Noted: 2023-10-16

## 2023-10-16 PROBLEM — I10 HTN (HYPERTENSION): Status: ACTIVE | Noted: 2023-10-16

## 2023-10-16 PROBLEM — Z96.659 STATUS POST KNEE REPLACEMENT: Status: ACTIVE | Noted: 2023-10-16

## 2023-10-16 LAB
GLUCOSE BLDC GLUCOMTR-MCNC: 114 MG/DL (ref 70–130)
GLUCOSE BLDC GLUCOMTR-MCNC: 181 MG/DL (ref 70–130)
GLUCOSE BLDC GLUCOMTR-MCNC: 195 MG/DL (ref 70–130)
GLUCOSE BLDC GLUCOMTR-MCNC: 236 MG/DL (ref 70–130)
HBA1C MFR BLD: 6.6 % (ref 4.8–5.6)

## 2023-10-16 PROCEDURE — 25010000002 ROPIVACAINE PER 1 MG: Performed by: ORTHOPAEDIC SURGERY

## 2023-10-16 PROCEDURE — 25010000002 PROPOFOL 200 MG/20ML EMULSION: Performed by: NURSE ANESTHETIST, CERTIFIED REGISTERED

## 2023-10-16 PROCEDURE — 25010000002 ONDANSETRON PER 1 MG: Performed by: NURSE ANESTHETIST, CERTIFIED REGISTERED

## 2023-10-16 PROCEDURE — 63710000001 INSULIN LISPRO (HUMAN) PER 5 UNITS: Performed by: NURSE PRACTITIONER

## 2023-10-16 PROCEDURE — 25010000002 VANCOMYCIN 1 G RECONSTITUTED SOLUTION: Performed by: ORTHOPAEDIC SURGERY

## 2023-10-16 PROCEDURE — 25010000002 KETOROLAC TROMETHAMINE PER 15 MG: Performed by: ORTHOPAEDIC SURGERY

## 2023-10-16 PROCEDURE — 97162 PT EVAL MOD COMPLEX 30 MIN: CPT

## 2023-10-16 PROCEDURE — 25010000002 EPINEPHRINE 1 MG/ML SOLUTION 30 ML VIAL: Performed by: ORTHOPAEDIC SURGERY

## 2023-10-16 PROCEDURE — G0378 HOSPITAL OBSERVATION PER HR: HCPCS

## 2023-10-16 PROCEDURE — 83036 HEMOGLOBIN GLYCOSYLATED A1C: CPT | Performed by: NURSE PRACTITIONER

## 2023-10-16 PROCEDURE — 25010000002 CLONIDINE PER 1 MG: Performed by: ORTHOPAEDIC SURGERY

## 2023-10-16 PROCEDURE — 73560 X-RAY EXAM OF KNEE 1 OR 2: CPT

## 2023-10-16 PROCEDURE — 97110 THERAPEUTIC EXERCISES: CPT

## 2023-10-16 PROCEDURE — 25010000002 HYDROMORPHONE 1 MG/ML SOLUTION: Performed by: NURSE ANESTHETIST, CERTIFIED REGISTERED

## 2023-10-16 PROCEDURE — 25810000003 LACTATED RINGERS PER 1000 ML: Performed by: ANESTHESIOLOGY

## 2023-10-16 PROCEDURE — 25010000002 DEXAMETHASONE SODIUM PHOSPHATE 20 MG/5ML SOLUTION: Performed by: NURSE ANESTHETIST, CERTIFIED REGISTERED

## 2023-10-16 PROCEDURE — 25010000002 SUGAMMADEX 200 MG/2ML SOLUTION: Performed by: NURSE ANESTHETIST, CERTIFIED REGISTERED

## 2023-10-16 PROCEDURE — 25010000002 CEFAZOLIN IN DEXTROSE 2-4 GM/100ML-% SOLUTION: Performed by: ORTHOPAEDIC SURGERY

## 2023-10-16 PROCEDURE — 82948 REAGENT STRIP/BLOOD GLUCOSE: CPT

## 2023-10-16 PROCEDURE — C1713 ANCHOR/SCREW BN/BN,TIS/BN: HCPCS | Performed by: ORTHOPAEDIC SURGERY

## 2023-10-16 PROCEDURE — 25010000002 FENTANYL CITRATE (PF) 50 MCG/ML SOLUTION: Performed by: NURSE ANESTHETIST, CERTIFIED REGISTERED

## 2023-10-16 PROCEDURE — C1776 JOINT DEVICE (IMPLANTABLE): HCPCS | Performed by: ORTHOPAEDIC SURGERY

## 2023-10-16 DEVICE — JOURNEY 7.5 ROUND RESURF PAT 35MM STANDARD
Type: IMPLANTABLE DEVICE | Site: KNEE | Status: FUNCTIONAL
Brand: JOURNEY

## 2023-10-16 DEVICE — IMPLANTABLE DEVICE: Type: IMPLANTABLE DEVICE | Status: FUNCTIONAL

## 2023-10-16 DEVICE — JOURNEY II BCS FEMORAL OXINIUM                                    RIGHT SIZE 3
Type: IMPLANTABLE DEVICE | Site: KNEE | Status: FUNCTIONAL
Brand: JOURNEY

## 2023-10-16 DEVICE — JOURNEY TIBIAL BASEPLATE NONPOROUS                                    RT SZ 2
Type: IMPLANTABLE DEVICE | Site: KNEE | Status: FUNCTIONAL
Brand: JOURNEY

## 2023-10-16 DEVICE — PALACOS® R IS A FAST-CURING, RADIOPAQUE, POLY(METHYL METHACRYLATE)-BASED BONE CEMENT.PALACOS ® R CONTAINS THE X-RAY CONTRAST MEDIUM ZIRCONIUM DIOXIDE. TO IMPROVE VISIBILITY IN THE SURGICAL FIELD PALACOS ® R HAS BEEN COLOURED WITH CHLOROPHYLL (E141). THE BONE CEMENT IS PREPARED DIRECTLY BEFORE USE BY MIXING A POLYMER POWDER COMPONENT WITH A LIQUID MONOMER COMPONENT. A DUCTILE DOUGH FORMS WHICH CURES WITHIN A FEW MINUTES.
Type: IMPLANTABLE DEVICE | Site: KNEE | Status: FUNCTIONAL
Brand: PALACOS®

## 2023-10-16 DEVICE — DEV CONTRL TISS STRATAFIX SPIRAL MNCRYL UD 3/0 PLS 30CM: Type: IMPLANTABLE DEVICE | Site: KNEE | Status: FUNCTIONAL

## 2023-10-16 DEVICE — DEV CONTRL TISS STRATAFIX SYMM PDS PLUS VIL CT-1 60CM: Type: IMPLANTABLE DEVICE | Site: KNEE | Status: FUNCTIONAL

## 2023-10-16 DEVICE — JOURNEY II BCS XLPE ARTICULAR                                    INSERT SIZE 1-2 RIGHT 11MM
Type: IMPLANTABLE DEVICE | Site: KNEE | Status: FUNCTIONAL
Brand: JOURNEY

## 2023-10-16 RX ORDER — FLUMAZENIL 0.1 MG/ML
0.2 INJECTION INTRAVENOUS AS NEEDED
Status: DISCONTINUED | OUTPATIENT
Start: 2023-10-16 | End: 2023-10-16 | Stop reason: HOSPADM

## 2023-10-16 RX ORDER — DROPERIDOL 2.5 MG/ML
0.62 INJECTION, SOLUTION INTRAMUSCULAR; INTRAVENOUS
Status: DISCONTINUED | OUTPATIENT
Start: 2023-10-16 | End: 2023-10-16 | Stop reason: HOSPADM

## 2023-10-16 RX ORDER — SODIUM CHLORIDE, SODIUM LACTATE, POTASSIUM CHLORIDE, CALCIUM CHLORIDE 600; 310; 30; 20 MG/100ML; MG/100ML; MG/100ML; MG/100ML
9 INJECTION, SOLUTION INTRAVENOUS CONTINUOUS
Status: DISCONTINUED | OUTPATIENT
Start: 2023-10-16 | End: 2023-10-17 | Stop reason: HOSPADM

## 2023-10-16 RX ORDER — FAMOTIDINE 20 MG/1
40 TABLET, FILM COATED ORAL DAILY
Status: DISCONTINUED | OUTPATIENT
Start: 2023-10-17 | End: 2023-10-17 | Stop reason: HOSPADM

## 2023-10-16 RX ORDER — ACETAMINOPHEN 500 MG
1000 TABLET ORAL ONCE
Status: COMPLETED | OUTPATIENT
Start: 2023-10-16 | End: 2023-10-16

## 2023-10-16 RX ORDER — DIPHENHYDRAMINE HYDROCHLORIDE 50 MG/ML
25 INJECTION INTRAMUSCULAR; INTRAVENOUS EVERY 6 HOURS PRN
Status: DISCONTINUED | OUTPATIENT
Start: 2023-10-16 | End: 2023-10-17 | Stop reason: HOSPADM

## 2023-10-16 RX ORDER — HYDROMORPHONE HYDROCHLORIDE 1 MG/ML
0.5 INJECTION, SOLUTION INTRAMUSCULAR; INTRAVENOUS; SUBCUTANEOUS
Status: DISCONTINUED | OUTPATIENT
Start: 2023-10-16 | End: 2023-10-16 | Stop reason: HOSPADM

## 2023-10-16 RX ORDER — CITALOPRAM 20 MG/1
20 TABLET ORAL DAILY
Status: DISCONTINUED | OUTPATIENT
Start: 2023-10-17 | End: 2023-10-17 | Stop reason: HOSPADM

## 2023-10-16 RX ORDER — INSULIN LISPRO 100 [IU]/ML
2-7 INJECTION, SOLUTION INTRAVENOUS; SUBCUTANEOUS
Status: DISCONTINUED | OUTPATIENT
Start: 2023-10-16 | End: 2023-10-17 | Stop reason: HOSPADM

## 2023-10-16 RX ORDER — NICOTINE POLACRILEX 4 MG
15 LOZENGE BUCCAL
Status: DISCONTINUED | OUTPATIENT
Start: 2023-10-16 | End: 2023-10-17 | Stop reason: HOSPADM

## 2023-10-16 RX ORDER — DIPHENHYDRAMINE HCL 25 MG
50 CAPSULE ORAL EVERY 6 HOURS PRN
Status: DISCONTINUED | OUTPATIENT
Start: 2023-10-16 | End: 2023-10-17 | Stop reason: HOSPADM

## 2023-10-16 RX ORDER — FAMOTIDINE 10 MG/ML
20 INJECTION, SOLUTION INTRAVENOUS ONCE
Status: DISCONTINUED | OUTPATIENT
Start: 2023-10-16 | End: 2023-10-16 | Stop reason: HOSPADM

## 2023-10-16 RX ORDER — LABETALOL HYDROCHLORIDE 5 MG/ML
5 INJECTION, SOLUTION INTRAVENOUS
Status: DISCONTINUED | OUTPATIENT
Start: 2023-10-16 | End: 2023-10-16 | Stop reason: HOSPADM

## 2023-10-16 RX ORDER — MIDAZOLAM HYDROCHLORIDE 1 MG/ML
1 INJECTION INTRAMUSCULAR; INTRAVENOUS
Status: DISCONTINUED | OUTPATIENT
Start: 2023-10-16 | End: 2023-10-16 | Stop reason: HOSPADM

## 2023-10-16 RX ORDER — TRANEXAMIC ACID 100 MG/ML
INJECTION, SOLUTION INTRAVENOUS AS NEEDED
Status: DISCONTINUED | OUTPATIENT
Start: 2023-10-16 | End: 2023-10-16 | Stop reason: SURG

## 2023-10-16 RX ORDER — DEXTROSE MONOHYDRATE 25 G/50ML
25 INJECTION, SOLUTION INTRAVENOUS
Status: DISCONTINUED | OUTPATIENT
Start: 2023-10-16 | End: 2023-10-17 | Stop reason: HOSPADM

## 2023-10-16 RX ORDER — OXYCODONE AND ACETAMINOPHEN 7.5; 325 MG/1; MG/1
1 TABLET ORAL EVERY 4 HOURS PRN
Status: DISCONTINUED | OUTPATIENT
Start: 2023-10-16 | End: 2023-10-16 | Stop reason: HOSPADM

## 2023-10-16 RX ORDER — ONDANSETRON 4 MG/1
4 TABLET, FILM COATED ORAL EVERY 6 HOURS PRN
Status: DISCONTINUED | OUTPATIENT
Start: 2023-10-16 | End: 2023-10-17 | Stop reason: HOSPADM

## 2023-10-16 RX ORDER — OXYCODONE HYDROCHLORIDE AND ACETAMINOPHEN 5; 325 MG/1; MG/1
2 TABLET ORAL EVERY 4 HOURS PRN
Status: DISCONTINUED | OUTPATIENT
Start: 2023-10-16 | End: 2023-10-17 | Stop reason: HOSPADM

## 2023-10-16 RX ORDER — PREGABALIN 75 MG/1
150 CAPSULE ORAL ONCE
Status: COMPLETED | OUTPATIENT
Start: 2023-10-16 | End: 2023-10-16

## 2023-10-16 RX ORDER — CEFAZOLIN SODIUM 2 G/100ML
2000 INJECTION, SOLUTION INTRAVENOUS ONCE
Status: COMPLETED | OUTPATIENT
Start: 2023-10-16 | End: 2023-10-16

## 2023-10-16 RX ORDER — EPHEDRINE SULFATE 50 MG/ML
5 INJECTION, SOLUTION INTRAVENOUS ONCE AS NEEDED
Status: DISCONTINUED | OUTPATIENT
Start: 2023-10-16 | End: 2023-10-16 | Stop reason: HOSPADM

## 2023-10-16 RX ORDER — PROMETHAZINE HYDROCHLORIDE 25 MG/1
25 SUPPOSITORY RECTAL ONCE AS NEEDED
Status: DISCONTINUED | OUTPATIENT
Start: 2023-10-16 | End: 2023-10-16 | Stop reason: HOSPADM

## 2023-10-16 RX ORDER — SODIUM CHLORIDE 0.9 % (FLUSH) 0.9 %
3 SYRINGE (ML) INJECTION EVERY 12 HOURS SCHEDULED
Status: DISCONTINUED | OUTPATIENT
Start: 2023-10-16 | End: 2023-10-16 | Stop reason: HOSPADM

## 2023-10-16 RX ORDER — OXYCODONE HYDROCHLORIDE AND ACETAMINOPHEN 5; 325 MG/1; MG/1
1 TABLET ORAL EVERY 4 HOURS PRN
Qty: 50 TABLET | Refills: 0 | Status: SHIPPED | OUTPATIENT
Start: 2023-10-16

## 2023-10-16 RX ORDER — DOCUSATE SODIUM 100 MG/1
100 CAPSULE, LIQUID FILLED ORAL 2 TIMES DAILY PRN
Status: DISCONTINUED | OUTPATIENT
Start: 2023-10-16 | End: 2023-10-17 | Stop reason: HOSPADM

## 2023-10-16 RX ORDER — ROCURONIUM BROMIDE 10 MG/ML
INJECTION, SOLUTION INTRAVENOUS AS NEEDED
Status: DISCONTINUED | OUTPATIENT
Start: 2023-10-16 | End: 2023-10-16 | Stop reason: SURG

## 2023-10-16 RX ORDER — ONDANSETRON 2 MG/ML
INJECTION INTRAMUSCULAR; INTRAVENOUS AS NEEDED
Status: DISCONTINUED | OUTPATIENT
Start: 2023-10-16 | End: 2023-10-16 | Stop reason: SURG

## 2023-10-16 RX ORDER — FENTANYL CITRATE 50 UG/ML
INJECTION, SOLUTION INTRAMUSCULAR; INTRAVENOUS AS NEEDED
Status: DISCONTINUED | OUTPATIENT
Start: 2023-10-16 | End: 2023-10-16 | Stop reason: SURG

## 2023-10-16 RX ORDER — ASPIRIN 81 MG/1
81 TABLET ORAL EVERY 12 HOURS
Qty: 60 TABLET | Refills: 0 | Status: SHIPPED | OUTPATIENT
Start: 2023-10-16 | End: 2023-11-15

## 2023-10-16 RX ORDER — DEXAMETHASONE SODIUM PHOSPHATE 4 MG/ML
INJECTION, SOLUTION INTRA-ARTICULAR; INTRALESIONAL; INTRAMUSCULAR; INTRAVENOUS; SOFT TISSUE AS NEEDED
Status: DISCONTINUED | OUTPATIENT
Start: 2023-10-16 | End: 2023-10-16 | Stop reason: SURG

## 2023-10-16 RX ORDER — SODIUM CHLORIDE 0.9 % (FLUSH) 0.9 %
3-10 SYRINGE (ML) INJECTION AS NEEDED
Status: DISCONTINUED | OUTPATIENT
Start: 2023-10-16 | End: 2023-10-16 | Stop reason: HOSPADM

## 2023-10-16 RX ORDER — ONDANSETRON 4 MG/1
4 TABLET, FILM COATED ORAL EVERY 6 HOURS PRN
Qty: 30 TABLET | Refills: 0 | Status: SHIPPED | OUTPATIENT
Start: 2023-10-16

## 2023-10-16 RX ORDER — SODIUM CHLORIDE 9 MG/ML
100 INJECTION, SOLUTION INTRAVENOUS CONTINUOUS
Status: DISCONTINUED | OUTPATIENT
Start: 2023-10-16 | End: 2023-10-17 | Stop reason: HOSPADM

## 2023-10-16 RX ORDER — IBUPROFEN 600 MG/1
1 TABLET ORAL
Status: DISCONTINUED | OUTPATIENT
Start: 2023-10-16 | End: 2023-10-17 | Stop reason: HOSPADM

## 2023-10-16 RX ORDER — FENTANYL CITRATE 50 UG/ML
50 INJECTION, SOLUTION INTRAMUSCULAR; INTRAVENOUS
Status: DISCONTINUED | OUTPATIENT
Start: 2023-10-16 | End: 2023-10-16 | Stop reason: HOSPADM

## 2023-10-16 RX ORDER — ONDANSETRON 2 MG/ML
4 INJECTION INTRAMUSCULAR; INTRAVENOUS EVERY 6 HOURS PRN
Status: DISCONTINUED | OUTPATIENT
Start: 2023-10-16 | End: 2023-10-17 | Stop reason: HOSPADM

## 2023-10-16 RX ORDER — LIDOCAINE HYDROCHLORIDE 10 MG/ML
0.5 INJECTION, SOLUTION INFILTRATION; PERINEURAL ONCE AS NEEDED
Status: DISCONTINUED | OUTPATIENT
Start: 2023-10-16 | End: 2023-10-16 | Stop reason: HOSPADM

## 2023-10-16 RX ORDER — PROMETHAZINE HYDROCHLORIDE 25 MG/1
25 TABLET ORAL ONCE AS NEEDED
Status: DISCONTINUED | OUTPATIENT
Start: 2023-10-16 | End: 2023-10-16 | Stop reason: HOSPADM

## 2023-10-16 RX ORDER — NALOXONE HCL 0.4 MG/ML
0.1 VIAL (ML) INJECTION
Status: DISCONTINUED | OUTPATIENT
Start: 2023-10-16 | End: 2023-10-17 | Stop reason: HOSPADM

## 2023-10-16 RX ORDER — ASPIRIN 81 MG/1
81 TABLET ORAL EVERY 12 HOURS SCHEDULED
Status: DISCONTINUED | OUTPATIENT
Start: 2023-10-17 | End: 2023-10-17 | Stop reason: HOSPADM

## 2023-10-16 RX ORDER — FENTANYL CITRATE 50 UG/ML
50 INJECTION, SOLUTION INTRAMUSCULAR; INTRAVENOUS ONCE AS NEEDED
Status: DISCONTINUED | OUTPATIENT
Start: 2023-10-16 | End: 2023-10-16 | Stop reason: HOSPADM

## 2023-10-16 RX ORDER — OXYCODONE HYDROCHLORIDE AND ACETAMINOPHEN 5; 325 MG/1; MG/1
1 TABLET ORAL EVERY 4 HOURS PRN
Status: DISCONTINUED | OUTPATIENT
Start: 2023-10-16 | End: 2023-10-17 | Stop reason: HOSPADM

## 2023-10-16 RX ORDER — PROPOFOL 10 MG/ML
INJECTION, EMULSION INTRAVENOUS AS NEEDED
Status: DISCONTINUED | OUTPATIENT
Start: 2023-10-16 | End: 2023-10-16 | Stop reason: SURG

## 2023-10-16 RX ORDER — ONDANSETRON 2 MG/ML
4 INJECTION INTRAMUSCULAR; INTRAVENOUS ONCE AS NEEDED
Status: DISCONTINUED | OUTPATIENT
Start: 2023-10-16 | End: 2023-10-16 | Stop reason: HOSPADM

## 2023-10-16 RX ORDER — DIPHENHYDRAMINE HYDROCHLORIDE 50 MG/ML
12.5 INJECTION INTRAMUSCULAR; INTRAVENOUS
Status: DISCONTINUED | OUTPATIENT
Start: 2023-10-16 | End: 2023-10-16 | Stop reason: HOSPADM

## 2023-10-16 RX ORDER — UREA 10 %
1 LOTION (ML) TOPICAL NIGHTLY PRN
Status: DISCONTINUED | OUTPATIENT
Start: 2023-10-16 | End: 2023-10-17 | Stop reason: HOSPADM

## 2023-10-16 RX ORDER — MELOXICAM 15 MG/1
15 TABLET ORAL DAILY
Status: DISCONTINUED | OUTPATIENT
Start: 2023-10-17 | End: 2023-10-17 | Stop reason: HOSPADM

## 2023-10-16 RX ORDER — VANCOMYCIN HYDROCHLORIDE 1 G/20ML
INJECTION, POWDER, LYOPHILIZED, FOR SOLUTION INTRAVENOUS AS NEEDED
Status: DISCONTINUED | OUTPATIENT
Start: 2023-10-16 | End: 2023-10-16 | Stop reason: HOSPADM

## 2023-10-16 RX ORDER — CELECOXIB 200 MG/1
200 CAPSULE ORAL ONCE
Status: COMPLETED | OUTPATIENT
Start: 2023-10-16 | End: 2023-10-16

## 2023-10-16 RX ORDER — IPRATROPIUM BROMIDE AND ALBUTEROL SULFATE 2.5; .5 MG/3ML; MG/3ML
3 SOLUTION RESPIRATORY (INHALATION) ONCE AS NEEDED
Status: DISCONTINUED | OUTPATIENT
Start: 2023-10-16 | End: 2023-10-16 | Stop reason: HOSPADM

## 2023-10-16 RX ORDER — LIDOCAINE HYDROCHLORIDE 20 MG/ML
INJECTION, SOLUTION EPIDURAL; INFILTRATION; INTRACAUDAL; PERINEURAL AS NEEDED
Status: DISCONTINUED | OUTPATIENT
Start: 2023-10-16 | End: 2023-10-16 | Stop reason: SURG

## 2023-10-16 RX ORDER — VALSARTAN 160 MG/1
160 TABLET ORAL DAILY
Status: DISCONTINUED | OUTPATIENT
Start: 2023-10-16 | End: 2023-10-17 | Stop reason: HOSPADM

## 2023-10-16 RX ORDER — HYDROCODONE BITARTRATE AND ACETAMINOPHEN 5; 325 MG/1; MG/1
1 TABLET ORAL ONCE AS NEEDED
Status: DISCONTINUED | OUTPATIENT
Start: 2023-10-16 | End: 2023-10-16 | Stop reason: HOSPADM

## 2023-10-16 RX ORDER — HYDRALAZINE HYDROCHLORIDE 20 MG/ML
5 INJECTION INTRAMUSCULAR; INTRAVENOUS
Status: DISCONTINUED | OUTPATIENT
Start: 2023-10-16 | End: 2023-10-16 | Stop reason: HOSPADM

## 2023-10-16 RX ORDER — CEFAZOLIN SODIUM 2 G/100ML
2000 INJECTION, SOLUTION INTRAVENOUS EVERY 8 HOURS
Qty: 200 ML | Refills: 0 | Status: COMPLETED | OUTPATIENT
Start: 2023-10-16 | End: 2023-10-17

## 2023-10-16 RX ORDER — NALOXONE HCL 0.4 MG/ML
0.2 VIAL (ML) INJECTION AS NEEDED
Status: DISCONTINUED | OUTPATIENT
Start: 2023-10-16 | End: 2023-10-16 | Stop reason: HOSPADM

## 2023-10-16 RX ADMIN — INSULIN LISPRO 2 UNITS: 100 INJECTION, SOLUTION INTRAVENOUS; SUBCUTANEOUS at 21:36

## 2023-10-16 RX ADMIN — ONDANSETRON 4 MG: 2 INJECTION INTRAMUSCULAR; INTRAVENOUS at 09:44

## 2023-10-16 RX ADMIN — FENTANYL CITRATE 50 MCG: 50 INJECTION, SOLUTION INTRAMUSCULAR; INTRAVENOUS at 09:11

## 2023-10-16 RX ADMIN — SODIUM CHLORIDE, POTASSIUM CHLORIDE, SODIUM LACTATE AND CALCIUM CHLORIDE 9 ML/HR: 600; 310; 30; 20 INJECTION, SOLUTION INTRAVENOUS at 08:16

## 2023-10-16 RX ADMIN — ACETAMINOPHEN 1000 MG: 500 TABLET ORAL at 08:13

## 2023-10-16 RX ADMIN — LIDOCAINE HYDROCHLORIDE 60 MG: 20 INJECTION, SOLUTION EPIDURAL; INFILTRATION; INTRACAUDAL; PERINEURAL at 09:11

## 2023-10-16 RX ADMIN — ROCURONIUM BROMIDE 50 MG: 10 INJECTION, SOLUTION INTRAVENOUS at 09:11

## 2023-10-16 RX ADMIN — SUGAMMADEX 200 MG: 100 INJECTION, SOLUTION INTRAVENOUS at 10:16

## 2023-10-16 RX ADMIN — CEFAZOLIN SODIUM 2000 MG: 2 INJECTION, SOLUTION INTRAVENOUS at 16:13

## 2023-10-16 RX ADMIN — INSULIN LISPRO 3 UNITS: 100 INJECTION, SOLUTION INTRAVENOUS; SUBCUTANEOUS at 16:46

## 2023-10-16 RX ADMIN — SODIUM CHLORIDE, POTASSIUM CHLORIDE, SODIUM LACTATE AND CALCIUM CHLORIDE: 600; 310; 30; 20 INJECTION, SOLUTION INTRAVENOUS at 09:05

## 2023-10-16 RX ADMIN — HYDROMORPHONE HYDROCHLORIDE 0.5 MG: 1 INJECTION, SOLUTION INTRAMUSCULAR; INTRAVENOUS; SUBCUTANEOUS at 09:52

## 2023-10-16 RX ADMIN — PREGABALIN 150 MG: 75 CAPSULE ORAL at 08:14

## 2023-10-16 RX ADMIN — VALSARTAN 160 MG: 160 TABLET, FILM COATED ORAL at 13:56

## 2023-10-16 RX ADMIN — DEXAMETHASONE SODIUM PHOSPHATE 8 MG: 4 INJECTION, SOLUTION INTRAMUSCULAR; INTRAVENOUS at 09:44

## 2023-10-16 RX ADMIN — OXYCODONE AND ACETAMINOPHEN 1 TABLET: 5; 325 TABLET ORAL at 20:31

## 2023-10-16 RX ADMIN — TRANEXAMIC ACID 1000 MG: 100 INJECTION INTRAVENOUS at 09:20

## 2023-10-16 RX ADMIN — CELECOXIB 200 MG: 200 CAPSULE ORAL at 08:14

## 2023-10-16 RX ADMIN — PROPOFOL 160 MG: 10 INJECTION, EMULSION INTRAVENOUS at 09:11

## 2023-10-16 RX ADMIN — CEFAZOLIN SODIUM 2000 MG: 2 INJECTION, SOLUTION INTRAVENOUS at 09:00

## 2023-10-16 NOTE — ANESTHESIA PREPROCEDURE EVALUATION
Anesthesia Evaluation     NPO Solid Status: > 8 hours             Airway   Mallampati: III  TM distance: >3 FB  Possible difficult intubation  Dental - normal exam     Pulmonary    (+) ,sleep apnea  (-) wheezes  Cardiovascular     ECG reviewed  Rhythm: regular    (+) hypertension, hyperlipidemia      Neuro/Psych  (+) psychiatric history Anxiety  GI/Hepatic/Renal/Endo    (+) GERD, diabetes mellitus    Musculoskeletal     Abdominal    Substance History      OB/GYN          Other                    Anesthesia Plan    ASA 3     general     (  D/W R&B of GA including but not limited to: heart, lung, liver, kidney, neurologic problems, positioning injuries, dental damage, corneal abrasion and TMJ.  .)  intravenous induction     Anesthetic plan, risks, benefits, and alternatives have been provided, discussed and informed consent has been obtained with: patient.    CODE STATUS:

## 2023-10-16 NOTE — OP NOTE
ROBOTIC Total Knee Replacement Operative Note  Dr. JOE Irizarry II  [phone]    PATIENT NAME: Christie Hollis  MRN: [id number]  : 1960 AGE: 63 y.o. GENDER: female  DATE OF OPERATION: 10/16/2023  PREOPERATIVE DIAGNOSIS: End Stage Arthritis  POSTOPERATIVE DIAGNOSIS: Same  OPERATION PERFORMED: Right Robotic Assisted Total Knee Arthroplasty  SURGEON: Vini Irizarry MD  Circulator: Robert Hewitt RN  Scrub Person: Kenya Dunbar PCT  Vendor Representative: Pedro Cardona  Orderly: Charis Cunningham  Assistant: Lluvia Esquivel PA  ANESTHESIA: General  ASSISTANT: YANDY Wooten. This case would not have been possible without another set of skilled surgical hands for retraction, use of instrumentation, and general assistance.  This assistance was vital to the success of the case.   ESTIMATED BLOOD LOSS: 50cc  SPONGE AND NEEDLE COUNT: Correct  INDICATIONS:   A discussion of operative versus nonoperative treatment was had with the patient and they failed conservative management. They elected to undergo total knee arthroplasty. The risks of surgery were discussed and included the risk of anesthesia, infection, damage to neurovascular structures, implant loosening/failure, fracture, hardware prominence, continued pain, early failure, the need for further procedures, medical complications, and others. No guarantees were made. The patient wished to proceed with surgery and a surgical consent was signed.    COMPONENTS:   Journey II BCS Oxinium Femoral Component: Size 3  Journey II Tibial Baseplate: 2   Posterior Stabilized Insert: 11  Patella: 35mm    PERTINENT FINDINGS: Degenerative Arthritis    DETAILS OF PROCEDURE:  The patient was met in the preoperative area. The site was marked. The consent and H&P were reviewed. The patient was then wheeled back to the operative suite and transferred to the operative table. The patient underwent anesthesia. A tourniquet was placed on the upper thigh.  Surgical alcohol was used to thoroughly clean the entire operative extremity.     The leg was then prepped in the normal sterile fashion and surgical space suits were used for the entire operative team. New outer gloves were used by all sterile surgical team members after final draping. After a surgical timeout, the tourniquet was inflated.     I began by inserting 2 pins into the tibial and femoral shafts and attaching the tibial and femoral robotic .    In flexion, a midline knee incision was utilized centered on the patella and ending medial to the tibial tubercle. Dissection was carried down to the knee capsule. A medial parapatellar ararthrotomy was completed. The patellar fat pad was excised. The MCL was minimally elevated to gain adequate exposure to the knee.  The suprapatellar fat pad was also excised.  The patella was subluxed laterally. The patella was held vertical using 2 clamps, and was then cut using a saw. The patella was then sized, and the lug holes were drilled. Excess patellar bone was removed using a saw. The patella was then protected during this case using the metal patella shield.    The ACL remnant, anterior horn of the lateral meniscus, and PCL were then resected.  Next I proceeded with a standard mapping of the knee including all relevant anatomic positions, range of motion, and stressing of ligaments.  I then planned out the knee including implant sizes, rotation, and bony resection to appropriately balance the knee as needed.      After the mapping and planning was complete, I turned my attention to the distal femur.  Using the bur, I was able to remove the distal femoral bone and create the initial lug holes.  I then used the punch to create the pinholes for the 5-in-1 cutting block.  The 5-in-1 cutting block was then snapped into place and pinned.  I used a saw to resect the anterior posterior and chamfer cuts.  These were all removed using a rongeur.    I then turned my  attention to the tibia.  Retractors were placed appropriately.  Using the robot for guidance, a cutting jig was attached to the tibia using 2 pins.  This was done just above the level of measured resection.  I then used a saw to cut the tibia and remove this bone.  At that point, I was able to use the bur to resect down to the actual intended target tibial resection line.  This was verified to be accurate afterwards on the robot screen.    At that point, the remaining meniscus and osteophytes were removed.  I then attached the femoral component which was well-seated. The femoral BCS box was then prepared for.  I then trialed the knee.  Any additional bony resection and/or soft tissue releases were then noted and performed at that time to fully balance the knee.    We next turned our attention back to the tibia to finish the tibial preparation. The tibia was measured and sized. The tibial baseplate was aligned with the rotation from the trialing process and verified to be positioned near the medial third of the tibial tubercle. The tibial surface was then prepared for the keel.     The knee was thoroughly irrigated with sterile saline using a pulse-lavage system while the final tibial baseplate, femoral component and patellar component were opened. Cement was prepared and mixed using standard techniques. Outer gloves were changed before implant handling to ensure no soft tissue or oily material was exposed to the surfaces of the final implants. The bony knee surfaces were dried and the implants were cemented in place, starting with the tibia, then the femur and finally the patella. Excess cement was removed at each step. A trial poly was utilized during cementation for compression. The tourniquet was taken down and adequate hemostasis was achieved. The knee was thoroughly irrigated once again.     The soft tissues about the knee were then injected with an anesthetic cocktail. Care was taken to avoid the peroneal  "nerve and the neurovascular bundle posteriorly. The cement was allowed to harden.  While the cement was hardening, I did remove all 4 pins those sites were closed.    After the cement was fully set, the knee was ranged with various thickness of polyethylene trials to ensure that the balance was appropriate after robotic resection. The knee was inspected for excess cement, which was removed. The real poly, of corresponding thickness was then opened and inserted into the knee. One final range of motion and stability test showed the knee to be in good condition with a well tracking patellar component.    The knee capsule was then closed after applying 1 g vancomycin.  The knee was then closed in layers.  A sterile dressing was applied.    The patient was awoken from anesthesia, moved to the [facility] and taken to the recovery room in stable condition. Sponge and needle count were correct. There were no complications. Patient tolerated the procedure well.    R \"Adonis\" Juan C TORRES MD  Orthopaedic Surgery  [facility]  [phone]                "

## 2023-10-16 NOTE — ANESTHESIA PROCEDURE NOTES
Airway  Urgency: elective    Date/Time: 10/16/2023 9:15 AM  Airway not difficult    General Information and Staff    Patient location during procedure: OR  CRNA/CAA: Kenna Schwarz CRNA    Indications and Patient Condition  Indications for airway management: airway protection    Preoxygenated: yes  Mask difficulty assessment: 1 - vent by mask    Final Airway Details  Final airway type: endotracheal airway      Successful airway: ETT  Cuffed: yes   Successful intubation technique: direct laryngoscopy  Endotracheal tube insertion site: oral  Blade: Allison  Blade size: 3  ETT size (mm): 7.0  Cormack-Lehane Classification: grade I - full view of glottis  Placement verified by: chest auscultation and capnometry   Cuff volume (mL): 6  Measured from: lips  ETT/EBT  to lips (cm): 20  Number of attempts at approach: 1  Assessment: lips, teeth, and gum same as pre-op and atraumatic intubation    Additional Comments  Smooth IV induction. Trachea intubated. Cuff up. Ett secured. BEBS. Dentition intact without injury.

## 2023-10-16 NOTE — PLAN OF CARE
Goal Outcome Evaluation:     Patient is a pleasant 63 y.o. female POD0 R TKA with expected post op weakness and impaired functional mobility. Patient is independent at baseline and lives at home with spouse- access to RW. Today, patient performed bed mobility with SBA, required CGA for transfers, and ambulated 60' CGA with a RW. Patient will benefit from skilled PT services acutely to address functional deficits as well as improve level of independence prior to discharge. Anticipate home with assist and  PT upon DC.      Anticipated Discharge Disposition (PT): home with home health, home with assist

## 2023-10-16 NOTE — THERAPY EVALUATION
Patient Name: Christie Hollis  : 1960    MRN: [id number]                              Today's Date: 10/16/2023       Admit Date: 10/16/2023    Visit Dx: No diagnosis found.  Patient Active Problem List   Diagnosis    Status post knee replacement    Type 2 diabetes mellitus, without long-term current use of insulin    HTN (hypertension)     Past Medical History:   Diagnosis Date    Anxiety     Arthritis     Diabetes mellitus     GERD (gastroesophageal reflux disease)     Hyperlipidemia     Hypertension     Right knee pain     Sleep apnea     NO MACHINE     Past Surgical History:   Procedure Laterality Date    BRACHIOPLASTY Bilateral     COLONOSCOPY      LAPAROSCOPIC GASTRIC BANDING        General Information       Row Name 10/16/23 1535          Physical Therapy Time and Intention    Document Type evaluation  -MS     Mode of Treatment physical therapy  -MS       Row Name 10/16/23 1535          General Information    Patient Profile Reviewed yes  -MS     Prior Level of Function independent:;all household mobility  access to RW  -MS     Existing Precautions/Restrictions fall  -MS     Barriers to Rehab none identified  -MS       Row Name 10/16/23 1535          Living Environment    People in Home spouse  -MS       Row Name 10/16/23 1535          Home Main Entrance    Number of Stairs, Main Entrance two  -MS     Stair Railings, Main Entrance railings safe and in good condition  -MS       Row Name 10/16/23 1535          Cognition    Orientation Status (Cognition) oriented x 4  -MS       Row Name 10/16/23 1535          Safety Issues, Functional Mobility    Safety Issues Affecting Function (Mobility) positioning of assistive device;insight into deficits/self-awareness;sequencing abilities  -MS     Impairments Affecting Function (Mobility) balance;strength;endurance/activity tolerance;pain;range of motion (ROM)  -MS     Comment, Safety Issues/Impairments (Mobility) Gait belt and non skid socks donned.  -MS                User Key  (r) = Recorded By, (t) = Taken By, (c) = Cosigned By      Initials Name Provider Type    Marta Harris, PT Physical Therapist                   Mobility       Row Name 10/16/23 1535          Bed Mobility    Bed Mobility supine-sit  -MS     Supine-Sit [place] (Bed Mobility) standby assist  -MS     Assistive Device (Bed Mobility) bed rails;head of bed elevated  -MS       Row Name 10/16/23 1535          Sit-Stand Transfer    Sit-Stand [place] (Transfers) contact guard;verbal cues  -MS     Assistive Device (Sit-Stand Transfers) walker, front-wheeled  -MS       Row Name 10/16/23 1535          Gait/Stairs (Locomotion)    [place] Level (Gait) contact guard;verbal cues  -MS     Assistive Device (Gait) walker, front-wheeled  -MS     Distance in Feet (Gait) 60  -MS     Deviations/Abnormal Patterns (Gait) nabor decreased;gait speed decreased  -MS     Bilateral Gait Deviations forward flexed posture  -MS     Comment, (Gait/Stairs) Sequencing and hand placement cues.  -MS       Row Name 10/16/23 1535          Mobility    Extremity Weight-bearing Status right lower extremity  -MS     Right Lower Extremity (Weight-bearing Status) weight-bearing as tolerated (WBAT)  -MS               User Key  (r) = Recorded By, (t) = Taken By, (c) = Cosigned By      Initials Name Provider Type    MS Marta Curiel, PT Physical Therapist                   Obj/Interventions       Row Name 10/16/23 1536          Range of Motion Comprehensive    Comment, General Range of Motion R LE limited 2/2 pain  -MS       Row Name 10/16/23 1536          Strength Comprehensive (MMT)    Comment, General Manual Muscle Testing (MMT) Assessment R LE Post op weakness  -MS       Row Name 10/16/23 1536          Motor Skills    Therapeutic Exercise --  R TKA protocol x 10 reps  -MS       Row Name 10/16/23 1536          Balance    Balance Assessment sitting static balance;standing static balance  -MS     Static Sitting Balance  standby assist  -MS     Position, Sitting Balance sitting edge of bed  -MS     Static Standing Balance contact guard  -MS     Position/Device Used, Standing Balance supported;walker, rolling  -MS       Row Name 10/16/23 1536          Sensory Assessment (Somatosensory)    Sensory Assessment (Somatosensory) sensation intact  -MS               User Key  (r) = Recorded By, (t) = Taken By, (c) = Cosigned By      Initials Name Provider Type    Marta Harris, PT Physical Therapist                   Goals/Plan       Row Name 10/16/23 1537          Bed Mobility Goal 1 (PT)    Activity/Assistive Device (Bed Mobility Goal 1, PT) bed mobility activities, all  -MS     [place] Level/Cues Needed (Bed Mobility Goal 1, PT) supervision required  -MS     Time Frame (Bed Mobility Goal 1, PT) 1 week  -MS       Row Name 10/16/23 1537          Transfer Goal 1 (PT)    Activity/Assistive Device (Transfer Goal 1, PT) transfers, all  -MS     [place] Level/Cues Needed (Transfer Goal 1, PT) supervision required  -MS     Time Frame (Transfer Goal 1, PT) 1 week  -MS       Row Name 10/16/23 1537          Gait Training Goal 1 (PT)    Activity/Assistive Device (Gait Training Goal 1, PT) gait (walking locomotion);walker, rolling  -MS     [place] Level (Gait Training Goal 1, PT) supervision required  -MS     Distance (Gait Training Goal 1, PT) 100  -MS     Time Frame (Gait Training Goal 1, PT) 1 week  -MS       Row Name 10/16/23 1537          Therapy Assessment/Plan (PT)    Planned Therapy Interventions (PT) balance training;bed mobility training;gait training;home exercise program;patient/family education;postural re-education;ROM (range of motion);stair training;strengthening;transfer training  -MS               User Key  (r) = Recorded By, (t) = Taken By, (c) = Cosigned By      Initials Name Provider Type    Marta Harris, PT Physical Therapist                   Clinical Impression       Row Name 10/16/23 7649           Pain    Pretreatment Pain Rating 4/10  -MS     Posttreatment Pain Rating 4/10  -MS     Pain Location - Side/Orientation Right  -MS     Pain Location incisional  -MS     Pain Location - knee  -MS     Pain Intervention(s) Repositioned;Ambulation/increased activity;Rest  -MS       Row Name 10/16/23 1536          Therapy Assessment/Plan (PT)    Rehab Potential (PT) good, to achieve stated therapy goals  -MS     Criteria for Skilled Interventions Met (PT) yes;meets criteria  -MS     Therapy Frequency (PT) daily  -MS       Row Name 10/16/23 1536          Vital Signs    O2 Delivery Pre Treatment room air  -MS       Row Name 10/16/23 1536          Positioning and Restraints    Pre-Treatment Position in bed  -MS     Post Treatment Position chair  -MS     In Chair notified nsg;reclined;call light within reach;encouraged to call for assist;exit alarm on  -MS               User Key  (r) = Recorded By, (t) = Taken By, (c) = Cosigned By      Initials Name Provider Type    Marta Harris, PT Physical Therapist                   Outcome Measures       Row Name 10/16/23 1537 10/16/23 1200       How much help from another person do you currently need...    Turning from your back to your side while in flat bed without using bedrails? 4  -MS 4  -LD    Moving from lying on back to sitting on the side of a flat bed without bedrails? 3  -MS 3  -LD    Moving to and from a bed to a chair (including a wheelchair)? 3  -MS 3  -LD    Standing up from a chair using your arms (e.g., wheelchair, bedside chair)? 3  -MS 3  -LD    Climbing 3-5 steps with a railing? 2  -MS 3  -LD    To walk in hospital room? 3  -MS 3  -LD    AM-PAC 6 Clicks Score (PT) 18  -MS 19  -LD    Highest level of mobility 6 --> Walked 10 steps or more  -MS 6 --> Walked 10 steps or more  -LD              User Key  (r) = Recorded By, (t) = Taken By, (c) = Cosigned By      Initials Name Provider Type    Marta Harris, PT Physical Therapist    Aline Pal RN  Registered Nurse                                 Physical Therapy Education       Title: PT OT SLP Therapies (In Progress)       Topic: Physical Therapy (In Progress)       Point: Mobility training (Done)       Learning Progress Summary             Patient Acceptance, E,TB, VU by MS at 10/16/2023 1538                         Point: Home exercise program (Done)       Learning Progress Summary             Patient Acceptance, E,TB, VU by MS at 10/16/2023 1538                         Point: Body mechanics (Not Started)       Learner Progress:  Not documented in this visit.              Point: Precautions (Not Started)       Learner Progress:  Not documented in this visit.                              User Key       Initials Effective Dates Name Provider Type Discipline    MS 06/16/21 -  Marta Curiel PT Physical Therapist PT                  PT Recommendation and Plan  Planned Therapy Interventions (PT): balance training, bed mobility training, gait training, home exercise program, patient/family education, postural re-education, ROM (range of motion), stair training, strengthening, transfer training        Time Calculation:         PT Charges       Row Name 10/16/23 1534             Time Calculation    Start Time 1438  -MS      Stop Time 1455  -MS      Time Calculation (min) 17 min  -MS      PT Received On 10/16/23  -MS      PT - Next Appointment 10/17/23  -MS      PT Goal Re-Cert Due Date 10/23/23  -MS                User Key  (r) = Recorded By, (t) = Taken By, (c) = Cosigned By      Initials Name Provider Type    MS CurielMarta PT Physical Therapist                  Therapy Charges for Today       Code Description Service Date Service Provider Modifiers Qty    10657368933 HC PT EVAL MOD COMPLEXITY 2 10/16/2023 Marta Curiel, PT GP 1    43740664226 HC PT THER PROC EA 15 MIN 10/16/2023 Marta Curiel, PT GP 1            PT G-Codes  AM-PAC 6 Clicks Score (PT): 18  PT Discharge Summary  Anticipated  Discharge Disposition (PT): home with home health, home with assist    Marta Curiel, PT  10/16/2023

## 2023-10-16 NOTE — CONSULTS
Patient Name:  Christie Hollis  YOB: 1960  MRN:  [id number]  Date of Admission:  10/16/2023  Date of Consult:  10/16/2023  Patient Care Team:  Pallavi Chaudhry MD as PCP - General  Pallavi Chaudhry MD as PCP - Family Medicine    Inpatient Hospitalist Consult  Consult performed by: Ava Andujar APRN  Consult ordered by: Vini Irizarry II, MD  Reason for consult: Evaluate status and make recommendations regarding treatment for diabetes and hypertension.        Subjective   History of Present Illness  Ms. Hollis is a 63 y.o. female that has been admitted to [facility] following elective RIGHT TOTAL KNEE ARTHROPLASTY WITH CORI ROBOT.  She has been admitted to an orthopedic floor following surgery and we were asked to see and assist with her medical problems, specifically relating to her DM2 and HTN.  At the time of my visit she denies any chest pain, SOA, nausea, vomiting or diarrhea.  She has tolerated a diet.  She does complain of expected postoperative discomfort.  She reports being in a normal state of health leading up to surgery. Her BP is normally well controlled and her last A1c was 6.5. She has mild chronic constipation managed on fiber and miralax.       Past Medical History:   Diagnosis Date    Anxiety     Arthritis     Diabetes mellitus     GERD (gastroesophageal reflux disease)     Hyperlipidemia     Hypertension     Right knee pain     Sleep apnea     NO MACHINE     Past Surgical History:   Procedure Laterality Date    BRACHIOPLASTY Bilateral     COLONOSCOPY      LAPAROSCOPIC GASTRIC BANDING       Family History   Problem Relation Age of Onset    Malig Hyperthermia Neg Hx      Social History     Tobacco Use    Smoking status: Never   Vaping Use    Vaping Use: Never used   Substance Use Topics    Alcohol use: Yes     Comment: OCCASIONALLY    Drug use: Never     Medications Prior to Admission   Medication Sig Dispense Refill Last Dose    B Complex Vitamins  (VITAMIN B COMPLEX PO) Take  by mouth. HOLD PRIOR TO SURG   Past Week    cholecalciferol (VITAMIN D3) 25 MCG (1000 UT) tablet Take 1 tablet by mouth Daily.   Past Week    citalopram (CeleXA) 20 MG tablet Take 1 tablet by mouth Daily.   10/16/2023 at 0645    Coenzyme Q10 (CoQ-10) 200 MG capsule Take  by mouth. HOLD PRIOR TO SURG   Past Week    IRON CR PO Take  by mouth. HOLD PRIOR TO SURG   Past Week    KRILL OIL PO Take  by mouth. HOLD PRIOR TO SURG   Past Week    lansoprazole (PREVACID) 15 MG capsule Take 1 capsule by mouth Daily.   10/16/2023 at 0645    Loratadine 10 MG capsule Take  by mouth.   Past Week    metFORMIN (GLUCOPHAGE) 500 MG tablet Take 2 tablets by mouth 2 (Two) Times a Day With Meals.   10/15/2023 at 2100    pioglitazone (ACTOS) 45 MG tablet Take 1 tablet by mouth Daily.   10/15/2023    Pseudoephedrine-guaiFENesin (MUCINEX D PO) Take  by mouth As Needed.   10/15/2023    rosuvastatin (CRESTOR) 20 MG tablet Take 1 tablet by mouth Every Night.   10/15/2023    valsartan (DIOVAN) 160 MG tablet Take 1 tablet by mouth Daily.   10/15/2023 at 0900    aspirin 325 MG tablet Take 1 tablet by mouth Daily. HOLDING FOR SURGERY   10/1/2023    meloxicam (MOBIC) 15 MG tablet Take 1 tablet by mouth Daily. HOLD PRIOR TO SURG   10/1/2023    Ozempic, 2 MG/DOSE, 8 MG/3ML solution pen-injector Inject  under the skin into the appropriate area as directed 1 (One) Time Per Week. LAST DOSE 10/5/2023 HOLDING FOR SURGERY   10/5/2023    Vitamin E 180 MG (400 UNIT) capsule capsule Take  by mouth. HOLD PRIOR TO SURG   10/1/2023     Allergies:  Sulfa antibiotics    Review of Systems   Constitutional:  Negative for appetite change and unexpected weight change.   HENT:  Negative for trouble swallowing.    Respiratory:  Negative for choking and shortness of breath.    Cardiovascular:  Negative for chest pain.   Gastrointestinal:  Negative for abdominal distention, abdominal pain, blood in stool, constipation, diarrhea, nausea and  vomiting.   Genitourinary:  Negative for difficulty urinating and dysuria.   Musculoskeletal:  Negative for back pain.   Skin:  Negative for color change.   Neurological:  Negative for dizziness.   Hematological:  Does not bruise/bleed easily.   Psychiatric/Behavioral: Negative.         Objective      Vital Signs  Temp:  [97.8 °F (36.6 °C)-98.6 °F (37 °C)] 97.8 °F (36.6 °C)  Heart Rate:  [76-99] 87  Resp:  [12-16] 16  BP: (135-155)/(67-79) 135/79  Body mass index is 35.43 kg/m².    Physical Exam  Vitals and nursing note reviewed.   Constitutional:       Appearance: Normal appearance. She is well-developed. She is not ill-appearing.   HENT:      Head: Normocephalic and atraumatic.   Eyes:      Pupils: Pupils are equal, round, and reactive to light.   Cardiovascular:      Rate and Rhythm: Normal rate and regular rhythm.      Heart sounds: Normal heart sounds.   Pulmonary:      Effort: Pulmonary effort is normal.      Breath sounds: Normal breath sounds.   Abdominal:      General: Bowel sounds are normal. There is no distension or abdominal bruit.      Palpations: Abdomen is soft. Abdomen is not rigid. There is no shifting dullness, fluid wave, mass or pulsatile mass.      Tenderness: There is no abdominal tenderness. There is no guarding.      Hernia: No hernia is present.   Musculoskeletal:         General: No swelling. Normal range of motion.      Comments: LLE still with decreased sensation post op    Skin:     General: Skin is warm and dry.   Neurological:      General: No focal deficit present.      Mental Status: She is alert and oriented to person, place, and time.   Psychiatric:         Behavior: Behavior normal.         Thought Content: Thought content normal.         Results Review:   I reviewed the patient's new clinical results.  I reviewed the patient's new imaging results and agree with the interpretation.  I reviewed the patient's other test results and agree with the interpretation              Invalid  "input(s): \"LABALBU\", \"PROT\"       Assessment/Plan     Active Hospital Problems    Diagnosis  POA    **Status post knee replacement [Z96.659]  Not Applicable    Type 2 diabetes mellitus, without long-term current use of insulin [E11.9]  Yes    HTN (hypertension) [I10]  Yes      Resolved Hospital Problems   No resolved problems to display.       Ms. Hollis is a 63 y.o. female who is s/p RIGHT TOTAL KNEE ARTHROPLASTY WITH CORI ROBOT.    She seems to be doing well thus far postoperatively.     DM2  Hold home oral diabetic medications.    low dose correctional factor as needed.A1c 6.5  Monitor glucose TID AC. For any BG less than 70 mg/dL, treat per hospital protocol   NCS diet.    HTN  Most recent BP is  135/79/.  Anticipate fluctuations in BP due to blood loss, hypovolemia, anesthesia, narcotics etc.   Holding parameters have been written for losartan  Continue IVFs as ordered.    Monitor renal function.    Constipation  Continue bowel regimen.     Preop labs reviewed. Monitor labs tmrw including CBC and CMP    DVT prophylaxis per surgery.  She was encouraged to use incentive spirometer as instructed.      Thank you very much for asking [facility] to be involved in this patient's care. We will follow along with you.      ESVIN Young  [facility]  10/16/23  13:27 EDT  "

## 2023-10-16 NOTE — ANESTHESIA POSTPROCEDURE EVALUATION
Patient: Christie Hollis    Procedure Summary       Date: 10/16/23 Room / Location:  [facility] [address] [place] [address] [facility]    Anesthesia Start: 0905 Anesthesia Stop: 1033    Procedure: RIGHT TOTAL KNEE ARTHROPLASTY WITH CORI ROBOT (Right: Knee) Diagnosis:     Surgeons: Vini Irizarry II, MD Provider: Inderjit Torres MD    Anesthesia Type: general ASA Status: 3            Anesthesia Type: general    Vitals  Vitals Value Taken Time   /67 10/16/23 1100   Temp 36.6 °C (97.9 °F) 10/16/23 1028   Pulse 87 10/16/23 1112   Resp 12 10/16/23 1100   SpO2 98 % 10/16/23 1112   Vitals shown include unfiled device data.        Post Anesthesia Care and Evaluation    Patient location during evaluation: PACU  Patient participation: complete - patient participated  Level of consciousness: awake  Pain management: adequate    Airway patency: patent  Anesthetic complications: No anesthetic complications    Cardiovascular status: acceptable  Respiratory status: acceptable  Hydration status: acceptable    Comments: /67   Pulse 89   Temp 36.6 °C (97.9 °F) (Oral)   Resp 12   SpO2 100%

## 2023-10-16 NOTE — PLAN OF CARE
Goal Outcome Evaluation:           Progress: improving  Outcome Evaluation: Pt is 63/F S/P of right TKA. Dressing is C/D/I VSS. Up with assist x1 and voiding per BRP. Accucheck and sliding scale ordered. Plan is to D/C home tomorrow with family. Will continue to monitor. Educated on glucose and BP monitoring.

## 2023-10-16 NOTE — H&P
Orthopaedic Surgery  History & Physical For Elective Total Knee  Dr. JOE Irizarry II  [phone]    HPI:  Patient is a 63 y.o. Not  or  female who presents with End-stage arthritis of the right knee. They failed conservative treatment of their knee pain and a thorough discussion of the risks and benefits of surgery was had. The patient wishes to continue with elective total knee replacement, they were scheduled and are here for surgery. They did get medical clearance as well as a thorough preoperative workup.    MEDICAL HISTORY  Past Medical History:   Diagnosis Date    Anxiety     Arthritis     Diabetes mellitus     GERD (gastroesophageal reflux disease)     Hyperlipidemia     Hypertension     Right knee pain     Sleep apnea     NO MACHINE     Past Surgical History:   Procedure Laterality Date    BRACHIOPLASTY Bilateral     COLONOSCOPY      LAPAROSCOPIC GASTRIC BANDING       Prior to Admission medications    Medication Sig Start Date End Date Taking? Authorizing Provider   B Complex Vitamins (VITAMIN B COMPLEX PO) Take  by mouth. HOLD PRIOR TO SURG   Yes Selena Mondragon MD   cholecalciferol (VITAMIN D3) 25 MCG (1000 UT) tablet Take 1 tablet by mouth Daily.   Yes Selena Mondragon MD   citalopram (CeleXA) 20 MG tablet Take 1 tablet by mouth Daily.   Yes Selena Mondragon MD   Coenzyme Q10 (CoQ-10) 200 MG capsule Take  by mouth. HOLD PRIOR TO SURG   Yes Selena Mondragon MD   IRON CR PO Take  by mouth. HOLD PRIOR TO SURG   Yes eSlena Mondragon MD   KRILL OIL PO Take  by mouth. HOLD PRIOR TO SURG   Yes Selena Mondragon MD   lansoprazole (PREVACID) 15 MG capsule Take 1 capsule by mouth Daily.   Yes Selena Mondragon MD   Loratadine 10 MG capsule Take  by mouth.   Yes Selena Mondragon MD   metFORMIN (GLUCOPHAGE) 500 MG tablet Take 2 tablets by mouth 2 (Two) Times a Day With Meals.   Yes Selena Mondragon MD   pioglitazone (ACTOS) 45 MG tablet Take 1 tablet by  mouth Daily.   Yes Seelna Monrdagon MD   Pseudoephedrine-guaiFENesin (MUCINEX D PO) Take  by mouth As Needed.   Yes Selena Mondragon MD   rosuvastatin (CRESTOR) 20 MG tablet Take 1 tablet by mouth Every Night.   Yes Selena Mondragon MD   valsartan (DIOVAN) 160 MG tablet Take 1 tablet by mouth Daily.   Yes Selena Mondragon MD   aspirin 325 MG tablet Take 1 tablet by mouth Daily. HOLDING FOR SURGERY    Selena Mondragon MD   meloxicam (MOBIC) 15 MG tablet Take 1 tablet by mouth Daily. HOLD PRIOR TO SURG    Selena Mondragon MD   Ozempic, 2 MG/DOSE, 8 MG/3ML solution pen-injector Inject  under the skin into the appropriate area as directed 1 (One) Time Per Week. LAST DOSE 10/5/2023 HOLDING FOR SURGERY    Selena Mondragon MD   Vitamin E 180 MG (400 UNIT) capsule capsule Take  by mouth. HOLD PRIOR TO SURG    Selena Mondragon MD       Allergies   Allergen Reactions    Sulfa Antibiotics Rash     Most Recent Immunizations   Administered Date(s) Administered    COVID-19 (MODERNA) 1st,2nd,3rd Dose Monovalent 03/30/2021    COVID-19 (MODERNA) Monovalent Original Booster 06/07/2022     Social History     Tobacco Use    Smoking status: Never    Smokeless tobacco: Not on file   Substance Use Topics    Alcohol use: Yes     Comment: OCCASIONALLY      Social History     Substance and Sexual Activity   Drug Use Never       REVIEW OF SYSTEMS:  Head: negative for headache  Respiratory: negative for shortness of breath.   Cardiovascular: negative for chest pain.   Gastrointestinal: negative abdominal pain.   Neurological: negative for LOC  Psychiatric/Behavioral: negative for memory loss.   All other systems reviewed and are negative    VITALS: /76 (BP Location: Left arm, Patient Position: Sitting)   Pulse 76   Temp 98.6 °F (37 °C) (Oral)   Resp 16   SpO2 100%  There is no height or weight on file to calculate BMI.    PHYSICAL EXAM:   CONSTITUTIONAL: A&Ox3, No acute distress  LUNGS: Equal  chest rise, no shortness of air  CARDIOVASCULAR: palpable peripheral pulses  SKIN: no skin lesions in the area examined  LYMPH: no lymphadenopathy in the area examined  EXTREMITY: Knee  Pulses:  Brisk Capillary Refill  Sensation: Intact to Saphenous, Sural, Deep Peroneal, Superficial Peroneal, and Tibial Nerves and grossly throughout extremity  Motor: 5/5 EHL/FHL/TA/GS motor complexes    RADIOLOGY REVIEW:   No radiology results for the last 7 days    LABS:   Results for the past 24 hours:   Recent Results (from the past 24 hour(s))   POC Glucose Once    Collection Time: 10/16/23  7:44 AM    Specimen: Blood   Result Value Ref Range    Glucose 114 70 - 130 mg/dL       IMPRESSION:  Patient is a 63 y.o. Not  or  female with end-stage arthritis of the right knee    PLAN:   Surgery: Elective total knee arthroplasty  Consent: The risks and benefits of operative versus nonoperative treatment were discussed. The patient elected to undergo operative treatment of their knee arthritis. The risks discussed included but were not limited to blood clots, MI, stroke, other medical complications, infection, damage to neurovascular structures, continued pain, hardware prominence, loss of range of motion, need for further procedures, and and risk of anesthesia..  No guarantees were made   Disposition: Elective right Total Knee Arthroplasty today.    Vini Irizarry II, MD  Orthopaedic Surgery  [facility]

## 2023-10-17 ENCOUNTER — HOME HEALTH ADMISSION (OUTPATIENT)
Dept: HOME HEALTH SERVICES | Facility: HOME HEALTHCARE | Age: 63
End: 2023-10-17
Payer: COMMERCIAL

## 2023-10-17 ENCOUNTER — TRANSCRIBE ORDERS (OUTPATIENT)
Dept: HOME HEALTH SERVICES | Facility: HOME HEALTHCARE | Age: 63
End: 2023-10-17
Payer: COMMERCIAL

## 2023-10-17 ENCOUNTER — DOCUMENTATION (OUTPATIENT)
Dept: HOME HEALTH SERVICES | Facility: HOME HEALTHCARE | Age: 63
End: 2023-10-17
Payer: COMMERCIAL

## 2023-10-17 VITALS
RESPIRATION RATE: 16 BRPM | DIASTOLIC BLOOD PRESSURE: 74 MMHG | BODY MASS INDEX: 35.44 KG/M2 | WEIGHT: 200 LBS | HEIGHT: 63 IN | OXYGEN SATURATION: 99 % | HEART RATE: 90 BPM | SYSTOLIC BLOOD PRESSURE: 131 MMHG | TEMPERATURE: 98.2 F

## 2023-10-17 DIAGNOSIS — Z96.651 TOTAL KNEE REPLACEMENT STATUS, RIGHT: Primary | ICD-10-CM

## 2023-10-17 LAB
ANION GAP SERPL CALCULATED.3IONS-SCNC: 9.8 MMOL/L (ref 5–15)
BUN SERPL-MCNC: 7 MG/DL (ref 8–23)
BUN/CREAT SERPL: 10.1 (ref 7–25)
CALCIUM SPEC-SCNC: 8.8 MG/DL (ref 8.6–10.5)
CHLORIDE SERPL-SCNC: 101 MMOL/L (ref 98–107)
CO2 SERPL-SCNC: 26.2 MMOL/L (ref 22–29)
CREAT SERPL-MCNC: 0.69 MG/DL (ref 0.57–1)
DEPRECATED RDW RBC AUTO: 40 FL (ref 37–54)
EGFRCR SERPLBLD CKD-EPI 2021: 97.1 ML/MIN/1.73
ERYTHROCYTE [DISTWIDTH] IN BLOOD BY AUTOMATED COUNT: 12.9 % (ref 12.3–15.4)
GLUCOSE BLDC GLUCOMTR-MCNC: 120 MG/DL (ref 70–130)
GLUCOSE BLDC GLUCOMTR-MCNC: 184 MG/DL (ref 70–130)
GLUCOSE SERPL-MCNC: 122 MG/DL (ref 65–99)
HCT VFR BLD AUTO: 33.1 % (ref 34–46.6)
HGB BLD-MCNC: 11.1 G/DL (ref 12–15.9)
MCH RBC QN AUTO: 29 PG (ref 26.6–33)
MCHC RBC AUTO-ENTMCNC: 33.5 G/DL (ref 31.5–35.7)
MCV RBC AUTO: 86.4 FL (ref 79–97)
PLATELET # BLD AUTO: 324 10*3/MM3 (ref 140–450)
PMV BLD AUTO: 9 FL (ref 6–12)
POTASSIUM SERPL-SCNC: 3.8 MMOL/L (ref 3.5–5.2)
RBC # BLD AUTO: 3.83 10*6/MM3 (ref 3.77–5.28)
SODIUM SERPL-SCNC: 137 MMOL/L (ref 136–145)
WBC NRBC COR # BLD: 11.54 10*3/MM3 (ref 3.4–10.8)

## 2023-10-17 PROCEDURE — 25010000002 CEFAZOLIN IN DEXTROSE 2-4 GM/100ML-% SOLUTION: Performed by: ORTHOPAEDIC SURGERY

## 2023-10-17 PROCEDURE — 97530 THERAPEUTIC ACTIVITIES: CPT

## 2023-10-17 PROCEDURE — 80048 BASIC METABOLIC PNL TOTAL CA: CPT | Performed by: ORTHOPAEDIC SURGERY

## 2023-10-17 PROCEDURE — G0378 HOSPITAL OBSERVATION PER HR: HCPCS

## 2023-10-17 PROCEDURE — 63710000001 INSULIN LISPRO (HUMAN) PER 5 UNITS: Performed by: NURSE PRACTITIONER

## 2023-10-17 PROCEDURE — 82948 REAGENT STRIP/BLOOD GLUCOSE: CPT

## 2023-10-17 PROCEDURE — 85027 COMPLETE CBC AUTOMATED: CPT | Performed by: ORTHOPAEDIC SURGERY

## 2023-10-17 RX ORDER — ONDANSETRON 4 MG/1
4 TABLET, FILM COATED ORAL EVERY 6 HOURS PRN
Qty: 30 TABLET | Refills: 0 | Status: SHIPPED | OUTPATIENT
Start: 2023-10-17

## 2023-10-17 RX ORDER — ASPIRIN 81 MG/1
81 TABLET ORAL EVERY 12 HOURS
Qty: 60 TABLET | Refills: 0 | Status: SHIPPED | OUTPATIENT
Start: 2023-10-17 | End: 2023-11-16

## 2023-10-17 RX ORDER — OXYCODONE HYDROCHLORIDE AND ACETAMINOPHEN 5; 325 MG/1; MG/1
1 TABLET ORAL EVERY 4 HOURS PRN
Qty: 50 TABLET | Refills: 0 | Status: SHIPPED | OUTPATIENT
Start: 2023-10-17

## 2023-10-17 RX ADMIN — VALSARTAN 160 MG: 160 TABLET, FILM COATED ORAL at 12:12

## 2023-10-17 RX ADMIN — INSULIN LISPRO 2 UNITS: 100 INJECTION, SOLUTION INTRAVENOUS; SUBCUTANEOUS at 12:13

## 2023-10-17 RX ADMIN — ASPIRIN 81 MG: 81 TABLET, COATED ORAL at 08:46

## 2023-10-17 RX ADMIN — OXYCODONE AND ACETAMINOPHEN 2 TABLET: 5; 325 TABLET ORAL at 00:08

## 2023-10-17 RX ADMIN — CEFAZOLIN SODIUM 2000 MG: 2 INJECTION, SOLUTION INTRAVENOUS at 00:08

## 2023-10-17 RX ADMIN — OXYCODONE AND ACETAMINOPHEN 1 TABLET: 5; 325 TABLET ORAL at 04:14

## 2023-10-17 RX ADMIN — CITALOPRAM 20 MG: 20 TABLET, FILM COATED ORAL at 08:49

## 2023-10-17 RX ADMIN — MELOXICAM 15 MG: 15 TABLET ORAL at 08:47

## 2023-10-17 RX ADMIN — OXYCODONE AND ACETAMINOPHEN 2 TABLET: 5; 325 TABLET ORAL at 08:46

## 2023-10-17 RX ADMIN — FAMOTIDINE 40 MG: 20 TABLET, FILM COATED ORAL at 08:46

## 2023-10-17 RX ADMIN — OXYCODONE AND ACETAMINOPHEN 2 TABLET: 5; 325 TABLET ORAL at 13:23

## 2023-10-17 NOTE — PLAN OF CARE
Goal Outcome Evaluation:  Plan of Care Reviewed With: patient        Progress: improving  Outcome Evaluation: Pt supine in bed at start of the session. SBA for bed mobility. Pt able to practice steps with CGA, performs 4 steps w B  HR and step to gait pattern. She ambulates back to room from therapy gym, 120' with RW and CGA . Pt continues to benefit from skilled PT, safe to D/C home today.

## 2023-10-17 NOTE — DISCHARGE SUMMARY
Orthopaedic Discharge Summary  Dr. DIAZ “Adonis” Juan C II  [phone]    NAME: Christie Hollis PCP: Pallavi Chaudhry MD   :  MRN: [id number]  [id number] LOS:  ADMIT: 0 days  10/16/2023   AGE/SEX: 63 y.o. female DC:  today             Admitting Diagnosis: Status post knee replacement [Z96.659]    Surgery Performed: MA ARTHRP KNE CONDYLE&PLATU MEDIAL&LAT COMPARTMENTS [31306] (RIGHT TOTAL KNEE ARTHROPLASTY WITH CORI ROBOT)    Discharge Medications:         Discharge Medications        New Medications        Instructions Start Date   aspirin 81 MG EC tablet  Replaces: aspirin 325 MG tablet   81 mg, Oral, Every 12 Hours      aspirin 81 MG EC tablet   81 mg, Oral, Every 12 Hours      ondansetron 4 MG tablet  Commonly known as: Zofran   4 mg, Oral, Every 6 Hours PRN      ondansetron 4 MG tablet  Commonly known as: Zofran   4 mg, Oral, Every 6 Hours PRN      oxyCODONE-acetaminophen 5-325 MG per tablet  Commonly known as: PERCOCET   1 tablet, Oral, Every 4 Hours PRN      oxyCODONE-acetaminophen 5-325 MG per tablet  Commonly known as: PERCOCET   1 tablet, Oral, Every 4 Hours PRN             Continue These Medications        Instructions Start Date   cholecalciferol 25 MCG (1000 UT) tablet  Commonly known as: VITAMIN D3   1,000 Units, Oral, Daily      citalopram 20 MG tablet  Commonly known as: CeleXA   20 mg, Oral, Daily      CoQ-10 200 MG capsule   Oral, HOLD PRIOR TO SURG      IRON CR PO   Oral, HOLD PRIOR TO SURG      KRILL OIL PO   Oral, HOLD PRIOR TO SURG      lansoprazole 15 MG capsule  Commonly known as: PREVACID   15 mg, Oral, Daily      Loratadine 10 MG capsule   Oral      meloxicam 15 MG tablet  Commonly known as: MOBIC   15 mg, Oral, Daily, HOLD PRIOR TO SURG      metFORMIN 500 MG tablet  Commonly known as: GLUCOPHAGE   1,000 mg, Oral, 2 Times Daily With Meals      MUCINEX D PO   Oral, As Needed      Ozempic (2 MG/DOSE) 8 MG/3ML solution pen-injector  Generic drug: Semaglutide (2 MG/DOSE)   Subcutaneous, Weekly,  LAST DOSE 10/5/2023 HOLDING FOR SURGERY      pioglitazone 45 MG tablet  Commonly known as: ACTOS   45 mg, Oral, Daily      rosuvastatin 20 MG tablet  Commonly known as: CRESTOR   20 mg, Oral, Nightly      valsartan 160 MG tablet  Commonly known as: DIOVAN   160 mg, Oral, Daily      VITAMIN B COMPLEX PO   Oral, HOLD PRIOR TO SURG      Vitamin E 180 MG (400 UNIT) capsule capsule   Oral, HOLD PRIOR TO SURG             Stop These Medications      aspirin 325 MG tablet  Replaced by: aspirin 81 MG EC tablet              Vitals:     Vitals:    10/17/23 0107 10/17/23 0532 10/17/23 0916 10/17/23 1343   BP: 120/71 119/68 127/72 131/74   BP Location: Right arm Right arm Right arm Right arm   Patient Position: Lying Lying Sitting Sitting   Pulse: 84 83 84 90   Resp: 16 16 16 16   Temp: 99.2 °F (37.3 °C) 99.1 °F (37.3 °C) 98.3 °F (36.8 °C) 98.2 °F (36.8 °C)   TempSrc: Oral Oral Oral Oral   SpO2: 96% 95% 97% 99%   Weight:       Height:           Labs:      Admission on 10/16/2023, Discharged on 10/17/2023   Component Date Value Ref Range Status    Glucose 10/16/2023 114  70 - 130 mg/dL Final    Glucose 10/16/2023 195 (H)  70 - 130 mg/dL Final    Hemoglobin A1C 10/16/2023 6.60 (H)  4.80 - 5.60 % Final    Glucose 10/16/2023 236 (H)  70 - 130 mg/dL Final    Glucose 10/16/2023 181 (H)  70 - 130 mg/dL Final    Glucose 10/17/2023 122 (H)  65 - 99 mg/dL Final    BUN 10/17/2023 7 (L)  8 - 23 mg/dL Final    Creatinine 10/17/2023 0.69  0.57 - 1.00 mg/dL Final    Sodium 10/17/2023 137  136 - 145 mmol/L Final    Potassium 10/17/2023 3.8  3.5 - 5.2 mmol/L Final    Chloride 10/17/2023 101  98 - 107 mmol/L Final    CO2 10/17/2023 26.2  22.0 - 29.0 mmol/L Final    Calcium 10/17/2023 8.8  8.6 - 10.5 mg/dL Final    BUN/Creatinine Ratio 10/17/2023 10.1  7.0 - 25.0 Final    Anion Gap 10/17/2023 9.8  5.0 - 15.0 mmol/L Final    eGFR 10/17/2023 97.1  >60.0 mL/min/1.73 Final    WBC 10/17/2023 11.54 (H)  3.40 - 10.80 10*3/mm3 Final    RBC 10/17/2023 3.83  " 3.77 - 5.28 10*6/mm3 Final    Hemoglobin 10/17/2023 11.1 (L)  12.0 - 15.9 g/dL Final    Hematocrit 10/17/2023 33.1 (L)  34.0 - 46.6 % Final    MCV 10/17/2023 86.4  79.0 - 97.0 fL Final    MCH 10/17/2023 29.0  26.6 - 33.0 pg Final    MCHC 10/17/2023 33.5  31.5 - 35.7 g/dL Final    RDW 10/17/2023 12.9  12.3 - 15.4 % Final    RDW-SD 10/17/2023 40.0  37.0 - 54.0 fl Final    MPV 10/17/2023 9.0  6.0 - 12.0 fL Final    Platelets 10/17/2023 324  140 - 450 10*3/mm3 Final    Glucose 10/17/2023 120  70 - 130 mg/dL Final    Glucose 10/17/2023 184 (H)  70 - 130 mg/dL Final        No results found.    Hospital Course:   63 y.o. female was admitted to [facility] to services of Vini Irizarry II, MD with Status post knee replacement [Z96.659] on 10/16/2023 and underwent [place] ARTHRP KNE CONDYLE&PLATU MEDIAL&LAT COMPARTMENTS [55419] (RIGHT TOTAL KNEE ARTHROPLASTY WITH CORI ROBOT). Post-operatively the patient transferred to the floor where the patient underwent mobilization therapy. Opioids were titrated to achieve appropriate pain management to allow for participation in mobilization exercises. Vital signs and laboratory values are now within safe parameters for discharge. The dressings and/or incision is intact without signs or symptoms of active infection. Operative extremity neurovascular status remains intact as compared to the preoperative exam. Appropriate education re: incision care, activity levels, medications, and follow up visits was completed and all questions were answered. The patient is now deemed stable for discharge.    HOME: The patient progressed well with physical therapy. There were cleared for discharge to home. The patietn was sent home in good condition}.       R \"Adonis\" Juan C TORRES MD  Orthopaedic Surgery  [facility]  [phone]                                               "

## 2023-10-17 NOTE — PROGRESS NOTES
[facility] to provide home PT per Careplan.  Referral for Dr Juan C rosado.  Patient states no personal history with our HH but  used our services in the past.  Verified all info on facesheet. D/Cing today.

## 2023-10-17 NOTE — CASE MANAGEMENT/SOCIAL WORK
Case Management Discharge Note      Final Note: dc home with [facility]          Selected Continued Care - Discharged on 10/17/2023 Admission date: 10/16/2023 - Discharge disposition: Home or Self Care      Destination    No services have been selected for the patient.                Durable Medical Equipment    No services have been selected for the patient.                Dialysis/Infusion    No services have been selected for the patient.                Home Medical Care Coordination complete.      Service Provider Selected Services Address Phone Fax Patient Preferred     [facility] [address] [address] [phone] --              Therapy    No services have been selected for the patient.                Community Resources    No services have been selected for the patient.                Community & DME    No services have been selected for the patient.                    Transportation Services  Private: Car    Final Discharge Disposition Code: 06 - home with home health care

## 2023-10-17 NOTE — DISCHARGE PLACEMENT REQUEST
"Christie Wong P \"Pat\" (63 y.o. Female)       Date of Birth   1960    Social Security Number       Address   [address] [place] [address]    Home Phone   [phone]    MRN   [id number]       Sikhism   Buddhism    Marital Status                               Admission Date   10/16/23    Admission Type   Elective    Admitting Provider   Vini Irizarry II, MD    Attending Provider   Vini Irizarry II, MD    Department, Room/Bed   [address], P798/1       Discharge Date       Discharge Disposition   Home or Self Care    Discharge Destination                                 Attending Provider: Vini Irizarry II, MD    Allergies: Sulfa Antibiotics    Isolation: None   Infection: None   Code Status: CPR    Ht: 160 cm (63\")   Wt: 90.7 kg (200 lb)    Admission Cmt: None   Principal Problem: Status post knee replacement [Z96.659]                   Active Insurance as of 10/16/2023       Primary Coverage       Payor Plan Insurance Group Employer/Plan Group    ANTHEM BLUE CROSS ANTHEM BLUE CROSS BLUE SHIELD PPO [id number]       Payor Plan Address Payor Plan Phone Number Payor Plan Fax Number Effective Dates    [address] [phone]  2/1/2016 - None Entered    [place] [id number]         Subscriber Name Subscriber Birth Date Member ID       CORTNEY WONG 1/31/1959 [id number]                     Emergency Contacts        (Rel.) Home Phone Work Phone Mobile Phone    Keaton Wong (Spouse) [phone] -- [phone]                "

## 2023-10-17 NOTE — DISCHARGE INSTRUCTIONS
Total Knee  Discharge Instructions  Dr. JOE Siddiqui” Juan C II  [phone]    INCISION CARE  Wash your hands prior to dressing changes  JUSTINA Wound VAC: Postoperatively you had a JUSTINA Wound Vac placed on the incision. This was placed under sterile conditions in the operating room. It remains in place for 7 days postoperatively. After 7 days, the entire dressing must be removed, including all of the sticky adhesive. The dressing and battery pack provide gentle suction to the incision and provide several benefits over a traditional dressing:  It maintains the sterile environment of the OR and reduces the risk of infection  The suction removes unwanted buildup of blood/hematoma under the skin to reduce swelling  The suction also promotes fresh blood supply to the skin and soft tissue to speed up healing  The postoperative scar is reduced in size  Showering is permitted immediately after surgery, but the battery pack must be protected or removed during the shower.   After 7 days the JUSTINA Wound Vac is removed. If there is no drainage, no dressing is required. If there is some scant drainage a dry bandage can be applied and changed daily until seen in the office or until the drainage stops.   No creams or ointments to the incisions until 4 weeks post op.  Do not touch or pick at the incision  Check incision every day and notify surgeon immediately if any of the following signs or symptoms are seen:  Increase in redness  Increase in swelling around the incision and of the entire extremity  Increase in pain  NEW drainage or oozing from the incision  Pulling apart of the edges of the incision  Increase in overall body temperature (greater than 100.4 degrees)  Zip-Line: your incision was closed with a state of the art device.   Is a non-invasive and easy to use wound closure device that replaces sutures, staples and glue for surgical incisions  It minimizes scarring and eliminating “railroad” marks that come with staples or  sutures  It makes removal as atraumatic as peeling off a bandage  Can be removed at home or by a physical therapist or nurse at 14 days postoperatively    ACTIVITIES  Exercises:  Physical therapy will begin immediately while in the hospital. Patients going to a nursing home will get therapy as part of their care at the SNU/SNF facility. Patients going home may also have a therapist come to the house to help them mobilize until they can safely get to an outpatient therapy facility.  Elevate the affected leg most of the day during the first week post operatively. Caution must be taken to avoid pillow placement directly under the heel of the leg, as this can cause pressure ulcers even with a soft pillow. All pillows and blankets should be placed underneath of the thigh and calf so that the heel is free-floating.  Use cold packs for 20-30 minutes approximately 5 times per day.  You should perform the daily stretching and strengthening exercises as taught by the therapist as often as possible. This can be done many times a day.  Full weight bearing is allowed after surgery. It will be sore/painful to put weight on the leg, but this will help the bone to heal and prevent complications such as pneumonia, bed sores and blood clots. Mobilization is vital to the recovery process.  Activities of Daily Living:  No tub baths, hot tubs, or swimming pools for 4 weeks.  May shower and let water run over the incision immediately after surgery. The battery pack of the JUSTIAN Wound Vac must be protected or removed while in the shower. After the JUSTINA is removed 7 days after surgery showering is permitted as long as there is no drainage from the wound.     Restrictions  Weight: It is ok to allow full weight bearing after surgery. Weight on the leg actually quickens the recovery process. While it will be sore/painful to put weight on the leg, it is safe to do so. Hip replacement after hip fracture has a much slower recover process. It can  take months to heal fully from a hip fracture and patients even make some slow benefits up to a year afterwards.   Driving: Many patients have questions about when it is safe to return to driving. The answer is that this is extremely variable. It depends on the extent of the surgery, as well as how quickly you heal. Certainly left leg surgeries make returning to driving easier while right leg surgeries require more extensive rehabilitation before driving can be safe. Until you can press down on the brake hard, and are off of all narcotics, driving is not permitted. Your surgeon cannot “clear” you to return to driving, only you can make the decision when you feel it is safe.    Medications  Anticoagulants: After upper extremity surgery most patients do not require an anticoagulant unless you have another injury that will be keeping you from mobilizing. Lower extremity surgery typically does require use of an anticoagulation medicine.   IF YOU HAD LOWER EXTREMITY SURGERY AND ARE NOT DISCHARGED HOME WITH ANY ANTICOAGULANT MEDICINE YOU SHOULD TAKE ASPIRIN 325mg DAILY FOR 30 DAYS POSTOPERATIVELY.  If you are discharged home with an anticoagulant such as Aspirin, Xarelto, Eliquis, Coumadin, or Lovenox, follow these simple instructions:   Notify surgeon immediately if any elio bleeding is noted in the urine, stool, emesis, or from the nose or the incision. Blood in the stool will often appear as black rather than red. Blood in urine may appear as pink. Blood in emesis may appear as brown/black like coffee grounds.  You will need to apply pressure for longer periods of time to any cuts or abrasions to stop bleeding  Avoid alcohol while taking anticoagulants  Most anticoagulants are to be taken for 30 days postoperatively. After this time, you may stop using them unless instructed otherwise.   If you were already taking an anticoagulant (commonly Aspirin, Coumadin, or Plavix) you will likely be resuming your normal dose  postoperatively and will be continuing that medication at the discretion of the prescribing physician.  Stool Softeners: You will be at greater risk of constipation after surgery due to being less mobile and the pain medications.  Take stool softeners as needed. Over the counter Colace 100 mg 1-2 capsules twice daily can be taken.  If stools become too loose or too frequent, please decreases the dosage or stop the stool softener.  If constipation occurs despite use of stool softeners, you are to continue the stool softeners and add a laxative (Milk of Magnesia 1 ounce daily as needed)  Drink plenty of fluids, and eat fruits and vegetables during your recovery time. Getting up and mobilizing will help the bowels to recover their regular function, as will weaning off of all narcotics when the pain becomes tolerable.  Pain Medications: Utilized after surgery are narcotics. This is some general information about these medications.  CLASSIFICATION: Pain medications are called Opioids and are narcotics  LEGALITIES: It is illegal to share narcotics with others  DRIVING: it is illegal to drive while under the influence of narcotics. Doing so is a DUI.  POTENTIAL SIDE EFFECTS: nausea, vomiting, itching, dizziness, drowsiness, dry mouth, constipation, and difficulty urinating.  POTENTIAL ADVERSE EFFECTS:  Opioid tolerance can develop with use of pain medications and this simply means that it requires more and more of the medication to control pain. However, this is seen more in patients that use opioids for longer periods of time.  Opioid dependence can develop with use of Opioids. People with opioid dependence will experience withdrawal symptoms upon cessation of the medication.  Opioid addiction can develop with use of Opioids. The incidence of this is very unlikely in patients who take the medications as ordered and stop the medications as instructed.  Opioid overdose can be dangerous, but is unlikely when the medication  is taken as ordered and stopped when ordered. It is important not to mix opioids with alcohol as this can lead to over sedation and respiratory difficulty.  DOSAGE:  After the initial surgical pain begins to resolve, you may begin to decrease the pain medication. By the end of a few weeks, you should be off of pain medications.  Refills will not be given by the office during evening hours, on weekends, or after 6 weeks post-op. You are responsible for weaning off of pain medication. You can increase the time between narcotic pills, taking one every 4 then 6 then 8 hours and so on.  To seek refills on pain medications during the initial 6-week post-operative period, you must call the office to request the refill. The office will then notify you when to  the prescription. DO NOT wait until you are out of the medication to request a refill. Prescriptions will not be filled over the weekend and depending on the schedule, it may take a couple days for the prescription to be available. Someone will have to pick the prescription in person at the office.    FOLLOW-UP VISITS  You will need to follow up in the office with your surgeon in 3 weeks, or as instructed elsewhere in your discharge paperwork. Please call this number [phone] to schedule this appointment. If you are going to an SNF/SNU facility, they will arrange for you to follow up in the office.  If you have any concerns or suspected complications prior to your follow up visit, please call the office. Do not wait until your appointment time if you suspect complications. These will need to be addressed in the office promptly.      Vini Irizarry II, MD  Orthopaedic Surgery  [facility]

## 2023-10-17 NOTE — PLAN OF CARE
Goal Outcome Evaluation:  Plan of Care Reviewed With: patient        Progress: improving  Outcome Evaluation: patient ambulating with assistance to bathroom and in hallway, voiding without difficulty, PO medication given for pain, plans to d/c home in am 10/17/23, educated on b/p and glucose monitoring

## 2023-10-17 NOTE — PROGRESS NOTES
Name: Christie Hollis ADMIT: 10/16/2023   : 1960  PCP: Pallavi Chaudhry MD    MRN: [id number] LOS: 0 days   AGE/SEX: 63 y.o. female  ROOM: [facility]     Subjective   Subjective   Patient ok post op with expected tenderness. No fever chills nausea vomiting CP SOB       Objective   Objective   Vital Signs  Temp:  [97.8 °F (36.6 °C)-99.2 °F (37.3 °C)] 98.3 °F (36.8 °C)  Heart Rate:  [83-90] 84  Resp:  [12-16] 16  BP: (117-149)/(68-79) 127/72  SpO2:  [95 %-99 %] 97 %  on   ;   Device (Oxygen Therapy): room air  Body mass index is 35.43 kg/m².  Physical Exam  Vitals reviewed.   Constitutional:       Appearance: Normal appearance. She is well-developed.   Cardiovascular:      Rate and Rhythm: Normal rate and regular rhythm.   Pulmonary:      Effort: Pulmonary effort is normal. No respiratory distress.      Breath sounds: Normal breath sounds.   Abdominal:      General: Bowel sounds are normal. There is no distension.      Palpations: Abdomen is soft.      Tenderness: There is no abdominal tenderness.   Musculoskeletal:      Comments: Right knee mild swelling post op.    Skin:     General: Skin is warm and dry.   Neurological:      General: No focal deficit present.      Mental Status: She is alert and oriented to person, place, and time.       Results Review     I reviewed the patient's new clinical results.  Results from last 7 days   Lab Units 10/17/23  0505   WBC 10*3/mm3 11.54*   HEMOGLOBIN g/dL 11.1*   PLATELETS 10*3/mm3 324     Results from last 7 days   Lab Units 10/17/23  0730   SODIUM mmol/L 137   POTASSIUM mmol/L 3.8   CHLORIDE mmol/L 101   CO2 mmol/L 26.2   BUN mg/dL 7*   CREATININE mg/dL 0.69   GLUCOSE mg/dL 122*   EGFR mL/min/1.73 97.1       Results from last 7 days   Lab Units 10/17/23  0730   CALCIUM mg/dL 8.8       Hemoglobin A1C   Date/Time Value Ref Range Status   10/16/2023 1503 6.60 (H) 4.80 - 5.60 % Final     Glucose   Date/Time Value Ref Range Status   10/17/2023 0705 120 70 - 130 mg/dL  Final   10/16/2023 2040 181 (H) 70 - 130 mg/dL Final   10/16/2023 1617 236 (H) 70 - 130 mg/dL Final   10/16/2023 1045 195 (H) 70 - 130 mg/dL Final   10/16/2023 0744 114 70 - 130 mg/dL Final       XR Knee 1 or 2 View Right    Result Date: 10/16/2023  Right knee arthroplasty as expected.  This report was finalized on 10/16/2023 11:16 AM by Dr. Pedro Mahoney M.D on Workstation: [id number]       I have personally reviewed all medications:  Scheduled Medications  aspirin, 81 mg, Oral, Q12H  citalopram, 20 mg, Oral, Daily  famotidine, 40 mg, Oral, Daily  insulin lispro, 2-7 Units, Subcutaneous, 4x Daily AC & at Bedtime  meloxicam, 15 mg, Oral, Daily  valsartan, 160 mg, Oral, Daily    Infusions  lactated ringers, 9 mL/hr, Last Rate: Stopped (10/16/23 1207)  sodium chloride, 100 mL/hr, Last Rate: Stopped (10/16/23 1400)    Diet  Diet: Diabetic Diets; Consistent Carbohydrate; Texture: Regular Texture (IDDSI 7); Fluid Consistency: Thin (IDDSI 0)    I have personally reviewed:  [x]  Laboratory   [x]  Microbiology   [x]  Radiology   [x]  EKG/Telemetry  [x]  Cardiology/Vascular   []  Pathology    []  Records       Assessment/Plan     Active Hospital Problems    Diagnosis  POA    **Status post knee replacement [Z96.659]  Not Applicable    Type 2 diabetes mellitus, without long-term current use of insulin [E11.9]  Yes    HTN (hypertension) [I10]  Yes      Resolved Hospital Problems   No resolved problems to display.       63 y.o. female admitted with Status post knee replacement.       Ms. Hollis is a 63 y.o. female who is s/p RIGHT TOTAL KNEE ARTHROPLASTY WITH CORI ROBOT.     She seems to be doing well thus far postoperatively.      DM2  Resume home oral diabetic medications at ID  NCS diet.     HTN   BP acceptable. Continue losartan    Stable renal function.     Constipation  Continue bowel regimen.       Mild leukocytosis postoperatively is reactive      DVT prophylaxis per surgery.  She was encouraged to use incentive  spirometer as instructed.     No objection to discharge today by ortho surgery    ESVIN Young  [facility]  10/17/23  11:00 EDT

## 2023-10-17 NOTE — CASE MANAGEMENT/SOCIAL WORK
Continued Stay Note  [facility]     Patient Name: Christie Hollis  MRN: [id number]  Today's Date: 10/17/2023    Admit Date: 10/16/2023    Plan: [facility] with [facility]    Discharge Plan       Row Name 10/17/23 1109       Plan    Plan home with [facility]     Patient/Family in Agreement with Plan yes    Plan Comments Per care plan, plan is home with [facility] . Referral placed in [facility]. They have accepted and will follow at [facility].                   Discharge Codes    No documentation.                 Expected Discharge Date and Time       Expected Discharge Date Expected Discharge Time    Oct 17, 2023               Dorothy Marinelli RN

## 2023-10-17 NOTE — THERAPY TREATMENT NOTE
Patient Name: Christie Hollis  : 1960    MRN: [id number]                              Today's Date: 10/17/2023       Admit Date: 10/16/2023    Visit Dx: No diagnosis found.  Patient Active Problem List   Diagnosis    Status post knee replacement    Type 2 diabetes mellitus, without long-term current use of insulin    HTN (hypertension)     Past Medical History:   Diagnosis Date    Anxiety     Arthritis     Diabetes mellitus     GERD (gastroesophageal reflux disease)     Hyperlipidemia     Hypertension     Right knee pain     Sleep apnea     NO MACHINE     Past Surgical History:   Procedure Laterality Date    BRACHIOPLASTY Bilateral     COLONOSCOPY      LAPAROSCOPIC GASTRIC BANDING        General Information       Row Name 10/17/23 1039          Physical Therapy Time and Intention    Document Type therapy note (daily note)  -DB     Mode of Treatment individual therapy;physical therapy  -DB       Row Name 10/17/23 1039          General Information    Patient Profile Reviewed yes  -DB               User Key  (r) = Recorded By, (t) = Taken By, (c) = Cosigned By      Initials Name Provider Type    DB Wanda Oneal PT Physical Therapist                   Mobility       Row Name 10/17/23 1039          Bed Mobility    Bed Mobility supine-sit  -DB     Supine-Sit Jackson (Bed Mobility) standby assist;verbal cues  -DB     Assistive Device (Bed Mobility) bed rails;head of bed elevated  -DB       Row Name 10/17/23 1039          Sit-Stand Transfer    Sit-Stand Jackson (Transfers) contact guard;verbal cues  -DB     Assistive Device (Sit-Stand Transfers) walker, front-wheeled  -DB       Row Name 10/17/23 1039          Gait/Stairs (Locomotion)    Jackson Level (Gait) contact guard;verbal cues  -DB     Assistive Device (Gait) walker, front-wheeled  -DB     Distance in Feet (Gait) 120'  -DB     Deviations/Abnormal Patterns (Gait) nabor decreased;gait speed decreased;antalgic;stride length decreased  -DB      Bilateral Gait Deviations forward flexed posture  -DB     Right Sided Gait Deviations heel strike decreased  -DB     Brooks Level (Stairs) contact guard;verbal cues  -DB     Handrail Location (Stairs) both sides  -DB     Number of Steps (Stairs) 4  -DB     Ascending Technique (Stairs) step-to-step  -DB     Descending Technique (Stairs) step-to-step  -DB     Stairs, Impairments pain;strength decreased;impaired balance  -DB               User Key  (r) = Recorded By, (t) = Taken By, (c) = Cosigned By      Initials Name Provider Type    Wanda Figueroa PT Physical Therapist                   Obj/Interventions       Row Name 10/17/23 1040          Balance    Balance Assessment sitting static balance;sitting dynamic balance;standing static balance;standing dynamic balance  -DB     Static Sitting Balance standby assist  -DB     Dynamic Sitting Balance standby assist  -DB     Position, Sitting Balance unsupported;sitting edge of bed  -DB     Static Standing Balance contact guard  -DB     Dynamic Standing Balance contact guard  -DB     Position/Device Used, Standing Balance supported;walker, front-wheeled  -DB     Balance Interventions sitting;standing;sit to stand  -DB               User Key  (r) = Recorded By, (t) = Taken By, (c) = Cosigned By      Initials Name Provider Type    Wanda Figueroa PT Physical Therapist                   Goals/Plan    No documentation.                  Clinical Impression       Row Name 10/17/23 1041          Pain    Pain Intervention(s) Ambulation/increased activity;Repositioned  -DB       Row Name 10/17/23 1041          Plan of Care Review    Plan of Care Reviewed With patient  -DB     Progress improving  -DB     Outcome Evaluation Pt supine in bed at start of the session. SBA for bed mobility. Pt able to practice steps with CGA, performs 4 steps w B  HR and step to gait pattern. She ambulates back to room from therapy gym, 120' with RW and CGA . Pt continues to benefit  from skilled PT, safe to D/C home today.  -DB       Row Name 10/17/23 1041          Vital Signs    O2 Delivery Pre Treatment room air  -DB     O2 Delivery Intra Treatment room air  -DB     O2 Delivery Post Treatment room air  -DB     Pre Patient Position Supine  -DB     Intra Patient Position Standing  -DB     Post Patient Position Sitting  -DB       Row Name 10/17/23 1041          Positioning and Restraints    Pre-Treatment Position in bed  -DB     Post Treatment Position chair  -DB     In Chair reclined;call light within reach;with nsg;encouraged to call for assist;exit alarm on  -DB               User Key  (r) = Recorded By, (t) = Taken By, (c) = Cosigned By      Initials Name Provider Type    DB Wanda Oneal, PT Physical Therapist                   Outcome Measures       Row Name 10/17/23 1044          How much help from another person do you currently need...    Turning from your back to your side while in flat bed without using bedrails? 4  -DB     Moving from lying on back to sitting on the side of a flat bed without bedrails? 4  -DB     Moving to and from a bed to a chair (including a wheelchair)? 3  -DB     Standing up from a chair using your arms (e.g., wheelchair, bedside chair)? 3  -DB     Climbing 3-5 steps with a railing? 3  -DB     To walk in hospital room? 3  -DB     AM-PAC 6 Clicks Score (PT) 20  -DB     Highest level of mobility 6 --> Walked 10 steps or more  -DB       Row Name 10/17/23 1044          Functional Assessment    Outcome Measure Options AM-PAC 6 Clicks Basic Mobility (PT)  -DB               User Key  (r) = Recorded By, (t) = Taken By, (c) = Cosigned By      Initials Name Provider Type    Wanda Figueroa, REYNA Physical Therapist                                 Physical Therapy Education       Title: PT OT SLP Therapies (Done)       Topic: Physical Therapy (Done)       Point: Mobility training (Done)       Learning Progress Summary             Patient Acceptance, E, VU by NOEL at  10/17/2023 1044    Acceptance, E,TB, VU by MS at 10/16/2023 1538                         Point: Home exercise program (Done)       Learning Progress Summary             Patient Acceptance, E, VU by DB at 10/17/2023 1044    Acceptance, E,TB, VU by MS at 10/16/2023 1538                         Point: Body mechanics (Done)       Learning Progress Summary             Patient Acceptance, E, VU by DB at 10/17/2023 1044                         Point: Precautions (Done)       Learning Progress Summary             Patient Acceptance, E, VU by DB at 10/17/2023 1044                                         User Key       Initials Effective Dates Name Provider Type Discipline    MS 06/16/21 -  Marta Curiel PT Physical Therapist PT    NOEL 06/16/21 -  Wanda Oneal PT Physical Therapist PT                  PT Recommendation and Plan     Plan of Care Reviewed With: patient  Progress: improving  Outcome Evaluation: Pt supine in bed at start of the session. SBA for bed mobility. Pt able to practice steps with CGA, performs 4 steps w B  HR and step to gait pattern. She ambulates back to room from therapy gym, 120' with RW and CGA . Pt continues to benefit from skilled PT, safe to D/C home today.     Time Calculation:         PT Charges       Row Name 10/17/23 1045             Time Calculation    Start Time 0835  -DB      Stop Time 0851  -DB      Time Calculation (min) 16 min  -DB      PT Received On 10/17/23  -DB      PT - Next Appointment 10/18/23  -DB         Time Calculation- PT    Total Timed Code Minutes- PT 16 minute(s)  -DB                User Key  (r) = Recorded By, (t) = Taken By, (c) = Cosigned By      Initials Name Provider Type    DB Wanda Oneal, PT Physical Therapist                  Therapy Charges for Today       Code Description Service Date Service Provider Modifiers Qty    88238288780 HC PT THERAPEUTIC ACT EA 15 MIN 10/17/2023 Wanda Oneal PT GP 1            PT G-Codes  Outcome Measure Options:  AM-PAC 6 Clicks Basic Mobility (PT)  -[facility] 6 Clicks Score (PT): 20       Wanda Oneal, PT  10/17/2023

## 2023-10-18 ENCOUNTER — HOME CARE VISIT (OUTPATIENT)
Dept: HOME HEALTH SERVICES | Facility: HOME HEALTHCARE | Age: 63
End: 2023-10-18
Payer: COMMERCIAL

## 2023-10-18 VITALS
TEMPERATURE: 97.3 F | DIASTOLIC BLOOD PRESSURE: 68 MMHG | OXYGEN SATURATION: 95 % | RESPIRATION RATE: 16 BRPM | SYSTOLIC BLOOD PRESSURE: 148 MMHG | HEART RATE: 94 BPM

## 2023-10-18 PROCEDURE — G0151 HHCP-SERV OF PT,EA 15 MIN: HCPCS

## 2023-10-18 NOTE — CASE COMMUNICATION
Patient will be 2w1, 3w1 for HH PT following right TKA 10/16/23.  Patient lives with  in home with steps to enter/ exit and steps to second floor and basement.  Patient has all medications.  JUSTINA dressing intact and functioning properly.  Patient able to perform HEP and walking with walker.  Will discharge to out patient PT starting 10/30/23.  Has ROM tech bike in home and has begun using.

## 2023-10-18 NOTE — HOME HEALTH
"Physical Therapy Evaluation   Diagnosis: right TKA  Focus of Care: right TKA  Surgical Procedure and date: right TKA 10/16/23  Pertinent Medical History: see epic     Prior level of function:   Ambulation: independent with no device   Activities of daily living: independent   Instrumental activities of daily living:  independent   Driving: driving    Other/ Hobbies/Work:   gym 2-3 times per week    Home Environment:  lives with  in home with steps to enter/ exit and steps to basement  Goal for Physical Therapy: \"to start feeling better and get back to old self without the pain\"    Skilled Physical Therapy indicated for instruction in gait, exercise, education and home safety.    Plan for next visit: review gait and exercise"

## 2023-10-20 ENCOUNTER — HOME CARE VISIT (OUTPATIENT)
Dept: HOME HEALTH SERVICES | Facility: HOME HEALTHCARE | Age: 63
End: 2023-10-20
Payer: COMMERCIAL

## 2023-10-20 VITALS
HEART RATE: 97 BPM | RESPIRATION RATE: 16 BRPM | SYSTOLIC BLOOD PRESSURE: 112 MMHG | OXYGEN SATURATION: 97 % | DIASTOLIC BLOOD PRESSURE: 80 MMHG | TEMPERATURE: 95.1 F

## 2023-10-20 PROCEDURE — G0151 HHCP-SERV OF PT,EA 15 MIN: HCPCS

## 2023-10-20 NOTE — HOME HEALTH
Plan for next visit: add standing exercises and work with cane    Subjective:  I'm getting up by myself and doing for myself.    Falls since last visit:  none   Home/Social Environment:  lives with  in home with steps to enter/ exit

## 2023-10-23 ENCOUNTER — HOME CARE VISIT (OUTPATIENT)
Dept: HOME HEALTH SERVICES | Facility: HOME HEALTHCARE | Age: 63
End: 2023-10-23
Payer: COMMERCIAL

## 2023-10-23 VITALS
RESPIRATION RATE: 16 BRPM | HEART RATE: 90 BPM | OXYGEN SATURATION: 98 % | TEMPERATURE: 97.3 F | SYSTOLIC BLOOD PRESSURE: 138 MMHG | DIASTOLIC BLOOD PRESSURE: 74 MMHG

## 2023-10-23 PROCEDURE — G0151 HHCP-SERV OF PT,EA 15 MIN: HCPCS

## 2023-10-23 NOTE — HOME HEALTH
Plan for next visit: add more standing exercises and review cane    Subjective:  I'm doing well.  I had a bowel movement Friday.    Falls since last visit:  none   Home/Social Environment:  lives with  in home with steps to enter/ exit

## 2023-10-25 ENCOUNTER — HOME CARE VISIT (OUTPATIENT)
Dept: HOME HEALTH SERVICES | Facility: HOME HEALTHCARE | Age: 63
End: 2023-10-25
Payer: COMMERCIAL

## 2023-10-25 VITALS
DIASTOLIC BLOOD PRESSURE: 72 MMHG | TEMPERATURE: 96.8 F | RESPIRATION RATE: 16 BRPM | HEART RATE: 89 BPM | OXYGEN SATURATION: 98 % | SYSTOLIC BLOOD PRESSURE: 140 MMHG

## 2023-10-25 PROCEDURE — G0151 HHCP-SERV OF PT,EA 15 MIN: HCPCS

## 2023-10-25 NOTE — CASE COMMUNICATION
Home Health agencies are required by Federal, State, & Accreditation regulations to notify the physician of the any patient related incidents.   The above patient had a fall at [facility] on 10/23/23.  Patient fell when taking a step with the wrong leg down first.   was there to assist patient up.  Appears to have no injury.  Please call [phone] if you have any questions.    Thank you,  Montserrat Watson, PT, MHS

## 2023-10-25 NOTE — HOME HEALTH
Plan for next visit: [facility]ASIS discharge    Subjective:  I took a tumble Monday afternoon.      Falls since last visit:  none   Home/Social Environment:  lives with  in home with steps to enter/ exit

## 2023-10-27 ENCOUNTER — HOME CARE VISIT (OUTPATIENT)
Dept: HOME HEALTH SERVICES | Facility: HOME HEALTHCARE | Age: 63
End: 2023-10-27
Payer: COMMERCIAL

## 2023-10-27 VITALS
OXYGEN SATURATION: 99 % | HEART RATE: 93 BPM | RESPIRATION RATE: 16 BRPM | SYSTOLIC BLOOD PRESSURE: 148 MMHG | DIASTOLIC BLOOD PRESSURE: 80 MMHG | TEMPERATURE: 96.9 F

## 2023-10-27 PROCEDURE — G0151 HHCP-SERV OF PT,EA 15 MIN: HCPCS

## 2023-10-27 NOTE — HOME HEALTH
Subjective:  I went out for a walk yesterday.  No more falls.        Falls since last visit:  none   Home/Social Environment:  lives with  in home with steps to enter/ exit

## 2024-03-12 DIAGNOSIS — I65.23 BILATERAL CAROTID ARTERY STENOSIS: Primary | ICD-10-CM

## 2024-05-01 ENCOUNTER — TRANSCRIBE ORDERS (OUTPATIENT)
Dept: ADMINISTRATIVE | Facility: HOSPITAL | Age: 64
End: 2024-05-01
Payer: MEDICARE

## 2024-05-01 DIAGNOSIS — E04.1 THYROID NODULE: Primary | ICD-10-CM

## 2024-05-23 ENCOUNTER — HOSPITAL ENCOUNTER (OUTPATIENT)
Dept: ULTRASOUND IMAGING | Facility: HOSPITAL | Age: 64
Discharge: HOME | End: 2024-05-23
Admitting: INTERNAL MEDICINE
Payer: MEDICARE

## 2024-05-23 DIAGNOSIS — E04.1 THYROID NODULE: ICD-10-CM

## 2024-05-23 PROCEDURE — 76536 US EXAM OF HEAD AND NECK: CPT

## 2024-06-12 ENCOUNTER — PREP FOR SURGERY (OUTPATIENT)
Dept: OTHER | Facility: HOSPITAL | Age: 64
End: 2024-06-12
Payer: MEDICARE

## 2024-06-12 DIAGNOSIS — Z12.11 SCREEN FOR COLON CANCER: Primary | ICD-10-CM

## 2024-07-09 PROBLEM — I65.29 CAROTID ARTERY STENOSIS: Status: ACTIVE | Noted: 2024-07-09

## 2024-07-12 ENCOUNTER — HOSPITAL ENCOUNTER (OUTPATIENT)
Facility: HOSPITAL | Age: 64
Discharge: HOME | End: 2024-07-12
Payer: MEDICARE

## 2024-07-12 ENCOUNTER — OFFICE VISIT (OUTPATIENT)
Age: 64
End: 2024-07-12
Payer: MEDICARE

## 2024-07-12 VITALS
HEIGHT: 63 IN | DIASTOLIC BLOOD PRESSURE: 82 MMHG | BODY MASS INDEX: 35.44 KG/M2 | WEIGHT: 200 LBS | SYSTOLIC BLOOD PRESSURE: 124 MMHG

## 2024-07-12 DIAGNOSIS — I65.23 BILATERAL CAROTID ARTERY STENOSIS: ICD-10-CM

## 2024-07-12 DIAGNOSIS — I65.23 BILATERAL CAROTID ARTERY STENOSIS: Primary | ICD-10-CM

## 2024-07-12 DIAGNOSIS — I65.23 CAROTID STENOSIS, BILATERAL: ICD-10-CM

## 2024-07-12 LAB
BH CV XLRA MEAS LEFT CAROTID BULB EDV: 22.9 CM/SEC
BH CV XLRA MEAS LEFT CAROTID BULB PSV: 73.9 CM/SEC
BH CV XLRA MEAS LEFT DIST CCA EDV: 15.2 CM/SEC
BH CV XLRA MEAS LEFT DIST CCA PSV: 76.2 CM/SEC
BH CV XLRA MEAS LEFT DIST ICA EDV: -36.4 CM/SEC
BH CV XLRA MEAS LEFT DIST ICA PSV: -110.2 CM/SEC
BH CV XLRA MEAS LEFT ICA/CCA RATIO: 1.45
BH CV XLRA MEAS LEFT MID CCA EDV: 15.7 CM/SEC
BH CV XLRA MEAS LEFT MID CCA PSV: 76.2 CM/SEC
BH CV XLRA MEAS LEFT MID ICA EDV: -33.4 CM/SEC
BH CV XLRA MEAS LEFT MID ICA PSV: -92 CM/SEC
BH CV XLRA MEAS LEFT PROX CCA EDV: -21.2 CM/SEC
BH CV XLRA MEAS LEFT PROX CCA PSV: -106.1 CM/SEC
BH CV XLRA MEAS LEFT PROX ECA EDV: -13.5 CM/SEC
BH CV XLRA MEAS LEFT PROX ECA PSV: -98.5 CM/SEC
BH CV XLRA MEAS LEFT PROX ICA EDV: -27.6 CM/SEC
BH CV XLRA MEAS LEFT PROX ICA PSV: -76.8 CM/SEC
BH CV XLRA MEAS LEFT PROX SCLA PSV: 188.6 CM/SEC
BH CV XLRA MEAS LEFT VERTEBRAL A EDV: 33.8 CM/SEC
BH CV XLRA MEAS LEFT VERTEBRAL A PSV: 129.6 CM/SEC
BH CV XLRA MEAS RIGHT CAROTID BULB EDV: -41.3 CM/SEC
BH CV XLRA MEAS RIGHT CAROTID BULB PSV: -158.1 CM/SEC
BH CV XLRA MEAS RIGHT DIST CCA EDV: 14.1 CM/SEC
BH CV XLRA MEAS RIGHT DIST CCA PSV: 70.9 CM/SEC
BH CV XLRA MEAS RIGHT DIST ICA EDV: -31.4 CM/SEC
BH CV XLRA MEAS RIGHT DIST ICA PSV: -126.5 CM/SEC
BH CV XLRA MEAS RIGHT ICA/CCA RATIO: 2.36
BH CV XLRA MEAS RIGHT MID CCA EDV: 14.9 CM/SEC
BH CV XLRA MEAS RIGHT MID CCA PSV: 85.6 CM/SEC
BH CV XLRA MEAS RIGHT MID ICA EDV: -32.7 CM/SEC
BH CV XLRA MEAS RIGHT MID ICA PSV: -167.6 CM/SEC
BH CV XLRA MEAS RIGHT PROX CCA EDV: -13.8 CM/SEC
BH CV XLRA MEAS RIGHT PROX CCA PSV: -121.8 CM/SEC
BH CV XLRA MEAS RIGHT PROX ECA EDV: -14.7 CM/SEC
BH CV XLRA MEAS RIGHT PROX ECA PSV: -131.6 CM/SEC
BH CV XLRA MEAS RIGHT PROX ICA EDV: -35.4 CM/SEC
BH CV XLRA MEAS RIGHT PROX ICA PSV: -153.2 CM/SEC
BH CV XLRA MEAS RIGHT PROX SCLA PSV: 332.3 CM/SEC
BH CV XLRA MEAS RIGHT VERTEBRAL A EDV: 18.8 CM/SEC
BH CV XLRA MEAS RIGHT VERTEBRAL A PSV: 52.8 CM/SEC
LEFT ARM BP: NORMAL MMHG
RIGHT ARM BP: NORMAL MMHG

## 2024-07-12 PROCEDURE — 93880 EXTRACRANIAL BILAT STUDY: CPT

## 2024-07-12 RX ORDER — AZELASTINE 1 MG/ML
2 SPRAY, METERED NASAL
COMMUNITY
Start: 2024-04-01

## 2024-07-12 RX ORDER — ALBUTEROL SULFATE 90 UG/1
2 AEROSOL, METERED RESPIRATORY (INHALATION)
COMMUNITY
Start: 2024-04-01

## 2024-07-12 NOTE — PROGRESS NOTES
Chief Complaint  Follow-up (12mo follow up with CTD scan. )    Subjective        Christie Hollis presents to [facility] VASCULAR SURGERY  HPI   Christie Hollis is a 64 y.o. female that has been followed in our office for carotid artery stenosis. She returns today in follow up along with a carotid duplex. She  reports she had a right knee replacement since the last visit. She denies any symptoms consistent with CVA, TIA, or amaurosis fugax.     Review of Systems   Constitutional:  Negative for fever.   Eyes:  Negative for visual disturbance.   Cardiovascular:  Negative for leg swelling.   Gastrointestinal:  Negative for abdominal pain.   Musculoskeletal:  Negative for back pain.   Skin:  Negative for color change, pallor and wound.   Neurological:  Negative for dizziness, facial asymmetry, speech difficulty and weakness.        Christie Hollis  reports that she has never smoked. She does not have any smokeless tobacco history on file..        Objective   Vital Signs:  Vitals:    07/12/24 1131   BP: 124/82      Body mass index is 35.44 kg/m².           Physical Exam  Vitals reviewed.   Constitutional:       Appearance: Normal appearance.   HENT:      Head: Normocephalic.   Cardiovascular:      Rate and Rhythm: Normal rate and regular rhythm.      Pulses: Normal pulses.           Dorsalis pedis pulses are 3+ on the right side and 3+ on the left side.        Posterior tibial pulses are 3+ on the right side and 3+ on the left side.   Pulmonary:      Effort: Pulmonary effort is normal.   Skin:     General: Skin is warm.   Neurological:      General: No focal deficit present.      Mental Status: She is alert and oriented to person, place, and time.   Psychiatric:         Mood and Affect: Mood normal.          Result Review :      Previous carotid duplex: <50% stenosis on the right and a less than 50% stenosis on the left.    Carotid duplex from today: Duplex Carotid Ultrasound CAR (07/12/2024 11:28)                     Assessment and Plan     Diagnoses and all orders for this visit:    1. Bilateral carotid artery stenosis (Primary)    2. Carotid stenosis, bilateral             Patient present today for follow up of carotid artery stenosis. She is to continue her antiplatelet agent which is aspirin. She is on a statin for cholesterol control.  We discussed adequate blood pressure control. She will return in 1 year along with a repeat carotid artery duplex.    Follow Up     Return in about 1 year (around 7/12/2025) for carotid duplex.  Patient was given instructions and counseling regarding her condition or for health maintenance advice. Please see specific information pulled into the AVS if appropriate.     ESVIN Garay

## 2024-07-29 ENCOUNTER — TRANSCRIBE ORDERS (OUTPATIENT)
Dept: ADMINISTRATIVE | Facility: HOSPITAL | Age: 64
End: 2024-07-29
Payer: MEDICARE

## 2024-07-29 DIAGNOSIS — Z12.31 SCREENING MAMMOGRAM, ENCOUNTER FOR: Primary | ICD-10-CM

## 2024-07-30 ENCOUNTER — HOSPITAL ENCOUNTER (OUTPATIENT)
Dept: GENERAL RADIOLOGY | Facility: HOSPITAL | Age: 64
Discharge: HOME | End: 2024-07-30
Payer: MEDICARE

## 2024-07-30 ENCOUNTER — PRE-ADMISSION TESTING (OUTPATIENT)
Dept: PREADMISSION TESTING | Facility: HOSPITAL | Age: 64
End: 2024-07-30
Payer: MEDICARE

## 2024-07-30 VITALS
SYSTOLIC BLOOD PRESSURE: 129 MMHG | BODY MASS INDEX: 33.33 KG/M2 | OXYGEN SATURATION: 97 % | HEART RATE: 91 BPM | TEMPERATURE: 98 F | RESPIRATION RATE: 16 BRPM | DIASTOLIC BLOOD PRESSURE: 66 MMHG | WEIGHT: 188.1 LBS | HEIGHT: 63 IN

## 2024-07-30 LAB
ALBUMIN SERPL-MCNC: 4.2 G/DL (ref 3.5–5.2)
ALBUMIN/GLOB SERPL: 1.6 G/DL
ALP SERPL-CCNC: 55 U/L (ref 39–117)
ALT SERPL W P-5'-P-CCNC: 13 U/L (ref 1–33)
ANION GAP SERPL CALCULATED.3IONS-SCNC: 9.8 MMOL/L (ref 5–15)
AST SERPL-CCNC: 16 U/L (ref 1–32)
BACTERIA UR QL AUTO: NORMAL /HPF
BILIRUB SERPL-MCNC: 0.2 MG/DL (ref 0–1.2)
BILIRUB UR QL STRIP: NEGATIVE
BUN SERPL-MCNC: 7 MG/DL (ref 8–23)
BUN/CREAT SERPL: 10.3 (ref 7–25)
CALCIUM SPEC-SCNC: 9.4 MG/DL (ref 8.6–10.5)
CHLORIDE SERPL-SCNC: 102 MMOL/L (ref 98–107)
CLARITY UR: CLEAR
CO2 SERPL-SCNC: 24.2 MMOL/L (ref 22–29)
COLOR UR: YELLOW
CREAT SERPL-MCNC: 0.68 MG/DL (ref 0.57–1)
DEPRECATED RDW RBC AUTO: 41.8 FL (ref 37–54)
EGFRCR SERPLBLD CKD-EPI 2021: 96.8 ML/MIN/1.73
ERYTHROCYTE [DISTWIDTH] IN BLOOD BY AUTOMATED COUNT: 13.2 % (ref 12.3–15.4)
GLOBULIN UR ELPH-MCNC: 2.6 GM/DL
GLUCOSE SERPL-MCNC: 144 MG/DL (ref 65–99)
GLUCOSE UR STRIP-MCNC: NEGATIVE MG/DL
HBA1C MFR BLD: 6.2 % (ref 4.8–5.6)
HCT VFR BLD AUTO: 38.1 % (ref 34–46.6)
HGB BLD-MCNC: 12 G/DL (ref 12–15.9)
HGB UR QL STRIP.AUTO: ABNORMAL
HYALINE CASTS UR QL AUTO: NORMAL /LPF
INR PPP: 0.94 (ref 0.9–1.1)
KETONES UR QL STRIP: NEGATIVE
LEUKOCYTE ESTERASE UR QL STRIP.AUTO: NEGATIVE
MCH RBC QN AUTO: 27.3 PG (ref 26.6–33)
MCHC RBC AUTO-ENTMCNC: 31.5 G/DL (ref 31.5–35.7)
MCV RBC AUTO: 86.8 FL (ref 79–97)
NITRITE UR QL STRIP: NEGATIVE
PH UR STRIP.AUTO: 5.5 [PH] (ref 5–8)
PLATELET # BLD AUTO: 366 10*3/MM3 (ref 140–450)
PMV BLD AUTO: 9.3 FL (ref 6–12)
POTASSIUM SERPL-SCNC: 4.1 MMOL/L (ref 3.5–5.2)
PROT SERPL-MCNC: 6.8 G/DL (ref 6–8.5)
PROT UR QL STRIP: NEGATIVE
PROTHROMBIN TIME: 12.8 SECONDS (ref 11.7–14.2)
QT INTERVAL: 369 MS
QTC INTERVAL: 429 MS
RBC # BLD AUTO: 4.39 10*6/MM3 (ref 3.77–5.28)
RBC # UR STRIP: NORMAL /HPF
REF LAB TEST METHOD: NORMAL
SODIUM SERPL-SCNC: 136 MMOL/L (ref 136–145)
SP GR UR STRIP: 1.01 (ref 1–1.03)
SQUAMOUS #/AREA URNS HPF: NORMAL /HPF
UROBILINOGEN UR QL STRIP: ABNORMAL
WBC # UR STRIP: NORMAL /HPF
WBC NRBC COR # BLD AUTO: 7.2 10*3/MM3 (ref 3.4–10.8)

## 2024-07-30 PROCEDURE — 83036 HEMOGLOBIN GLYCOSYLATED A1C: CPT

## 2024-07-30 PROCEDURE — 80053 COMPREHEN METABOLIC PANEL: CPT | Performed by: ORTHOPAEDIC SURGERY

## 2024-07-30 PROCEDURE — 85610 PROTHROMBIN TIME: CPT

## 2024-07-30 PROCEDURE — 93010 ELECTROCARDIOGRAM REPORT: CPT | Performed by: INTERNAL MEDICINE

## 2024-07-30 PROCEDURE — 36415 COLL VENOUS BLD VENIPUNCTURE: CPT

## 2024-07-30 PROCEDURE — 81001 URINALYSIS AUTO W/SCOPE: CPT

## 2024-07-30 PROCEDURE — 85027 COMPLETE CBC AUTOMATED: CPT | Performed by: ORTHOPAEDIC SURGERY

## 2024-07-30 PROCEDURE — 71046 X-RAY EXAM CHEST 2 VIEWS: CPT

## 2024-07-30 PROCEDURE — 93005 ELECTROCARDIOGRAM TRACING: CPT

## 2024-07-30 PROCEDURE — 73560 X-RAY EXAM OF KNEE 1 OR 2: CPT

## 2024-07-30 NOTE — DISCHARGE INSTRUCTIONS
Take the following medications the morning of surgery:    LANSOPRAZOLE  CITALOPRAM    If you are on prescription narcotic pain medication to control your pain you may also take that medication the morning of surgery.    General Instructions:  Do not eat solid food after midnight the night before surgery.  You may drink clear liquids day of surgery but must stop at least one hour before your hospital arrival time.  It is beneficial for you to have a clear drink that contains carbohydrates the day of surgery.  We suggest a 12 to 20 ounce bottle of Gatorade or Powerade for non-diabetic patients or a 12 to 20 ounce bottle of G2 or Powerade Zero for diabetic patients. (Pediatric patients, are not advised to drink a 12 to 20 ounce carbohydrate drink)    Clear liquids are liquids you can see through.  Nothing red in color.     Plain water                                  Sports drinks  Sodas                                   Gelatin (Jell-O)  Fruit juices without pulp such as white grape juice and apple juice  Popsicles that contain no fruit or yogurt  Tea or coffee (no cream or milk added)  Gatorade / Powerade  G2 / Powerade Zero    Chicken / beef / pork / vegetable bullion / cubes or any formulation are not considered clear liquids and are not allowed.    Infants may have breast milk up to four hours before surgery.  Infants drinking formula may drink formula up to six hours before surgery.   Patients who avoid smoking, chewing tobacco and alcohol for 4 weeks prior to surgery have a reduced risk of post-operative complications.  Quit smoking as many days before surgery as you can.  Do not smoke, use chewing tobacco or drink alcohol the day of surgery.   If applicable bring your C-PAP/ BI-PAP machine in with you to preop day of surgery.  Bring any papers given to you in the doctor’s office.  Wear clean comfortable clothes.  Do not wear contact lenses, false eyelashes or make-up.  Bring a case for your glasses.   Bring  crutches or walker if applicable.  Remove all piercings.  Leave jewelry and any other valuables at home.  Hair extensions with metal clips must be removed prior to surgery.  The Pre-Admission Testing nurse will instruct you to bring medications if unable to obtain an accurate list in Pre-Admission Testing.        If you were given a blood bank ID arm band remember to bring it with you the day of surgery.    Preventing a Surgical Site Infection:  For 2 to 3 days before surgery, avoid shaving with a razor because the razor can irritate skin and make it easier to develop an infection.    Any areas of open skin can increase the risk of a post-operative wound infection by allowing bacteria to enter and travel throughout the body.  Notify your surgeon if you have any skin wounds / rashes even if it is not near the expected surgical site.  The area will need assessed to determine if surgery should be delayed until it is healed.  The night prior to surgery shower using a fresh bar of anti-bacterial soap (such as Dial) and clean washcloth.  Sleep in a clean bed with clean clothing.  Do not allow pets to sleep with you.  Shower on the morning of surgery using a fresh bar of anti-bacterial soap (such as Dial) and clean washcloth.  Dry with a clean towel and dress in clean clothing.  Ask your surgeon if you will be receiving antibiotics prior to surgery.  Make sure you, your family, and all healthcare providers clean their hands with soap and water or an alcohol based hand  before caring for you or your wound.    Day of surgery:  Your arrival time is approximately two hours before your scheduled surgery time.  Upon arrival, a Pre-op nurse and Anesthesiologist will review your health history, obtain vital signs, and answer questions you may have.  The only belongings needed at this time will be a list of your home medications and if applicable your C-PAP/BI-PAP machine.  A Pre-op nurse will start an IV and you may  receive medication in preparation for surgery, including something to help you relax.     Please be aware that surgery does come with discomfort.  We want to make every effort to control your discomfort so please discuss any uncontrolled symptoms with your nurse.   Your doctor will most likely have prescribed pain medications.      If you are going home after surgery you will receive individualized written care instructions before being discharged.  A responsible adult must drive you to and from the hospital on the day of your surgery and ideally stay with you through the night.   .  Discharge prescriptions can be filled by the hospital pharmacy during regular pharmacy hours.  If you are having surgery late in the day/evening your prescription may be e-prescribed to your pharmacy.  Please verify your pharmacy hours or chose a 24 hour pharmacy to avoid not having access to your prescription because your pharmacy has closed for the day.    If you are staying overnight following surgery, you will be transported to your hospital room following the recovery period.  [facility] has all private rooms.    If you have any questions please call Pre-Admission Testing at [phone].  Deductibles and co-payments are collected on the day of service. Please be prepared to pay the required co-pay, deductible or deposit on the day of service as defined by your plan.    Call your surgeon immediately if you experience any of the following symptoms:  Sore Throat  Shortness of Breath or difficulty breathing  Cough  Chills  Body soreness or muscle pain  Headache  Fever  New loss of taste or smell  Do not arrive for your surgery ill.  Your procedure will need to be rescheduled to another time.  You will need to call your physician before the day of surgery to avoid any unnecessary exposure to hospital staff as well as other patients.      CHLORHEXIDINE CLOTH INSTRUCTIONS  The morning of surgery follow these instructions  using the Chlorhexidine cloths you've been given.  These steps reduce bacteria on the body.  Do not use the cloths near your eyes, ears mouth, genitalia or on open wounds.  Throw the cloths away after use but do not try to flush them down a toilet.      Open and remove one cloth at a time from the package.    Leave the cloth unfolded and begin the bathing.  Massage the skin with the cloths using gentle pressure to remove bacteria.  Do not scrub harshly.   Follow the steps below with one 2% CHG cloth per area (6 total cloths).  One cloth for neck, shoulders and chest.  One cloth for both arms, hands, fingers and underarms (do underarms last).  One cloth for the abdomen followed by groin.  One cloth for right leg and foot including between the toes.  One cloth for left leg and foot including between the toes.  The last cloth is to be used for the back of the neck, back and buttocks.    Allow the CHG to air dry 3 minutes on the skin which will give it time to work and decrease the chance of irritation.  The skin may feel sticky until it is dry.  Do not rinse with water or any other liquid or you will lose the beneficial effects of the CHG.  If mild skin irritation occurs, do rinse the skin to remove the CHG.  Report this to the nurse at time of admission.  Do not apply lotions, creams, ointments, deodorants or perfumes after using the clothes. Dress in clean clothes before coming to the hospital.

## 2024-08-05 ENCOUNTER — APPOINTMENT (OUTPATIENT)
Dept: GENERAL RADIOLOGY | Facility: HOSPITAL | Age: 64
End: 2024-08-05
Payer: MEDICARE

## 2024-08-05 ENCOUNTER — ANESTHESIA EVENT (OUTPATIENT)
Dept: PERIOP | Facility: HOSPITAL | Age: 64
End: 2024-08-05
Payer: MEDICARE

## 2024-08-05 ENCOUNTER — HOSPITAL ENCOUNTER (OUTPATIENT)
Facility: HOSPITAL | Age: 64
Discharge: HOME | End: 2024-08-06
Attending: ORTHOPAEDIC SURGERY | Admitting: ORTHOPAEDIC SURGERY
Payer: MEDICARE

## 2024-08-05 ENCOUNTER — ANESTHESIA (OUTPATIENT)
Dept: PERIOP | Facility: HOSPITAL | Age: 64
End: 2024-08-05
Payer: MEDICARE

## 2024-08-05 PROBLEM — G47.33 OSA (OBSTRUCTIVE SLEEP APNEA): Status: ACTIVE | Noted: 2024-08-05

## 2024-08-05 PROBLEM — E78.5 HYPERLIPIDEMIA: Status: ACTIVE | Noted: 2024-08-05

## 2024-08-05 PROBLEM — K21.9 GERD WITHOUT ESOPHAGITIS: Status: ACTIVE | Noted: 2024-08-05

## 2024-08-05 LAB
GLUCOSE BLDC GLUCOMTR-MCNC: 124 MG/DL (ref 70–130)
GLUCOSE BLDC GLUCOMTR-MCNC: 183 MG/DL (ref 70–130)
GLUCOSE BLDC GLUCOMTR-MCNC: 188 MG/DL (ref 70–130)

## 2024-08-05 PROCEDURE — 25810000003 LACTATED RINGERS PER 1000 ML: Performed by: STUDENT IN AN ORGANIZED HEALTH CARE EDUCATION/TRAINING PROGRAM

## 2024-08-05 PROCEDURE — C1776 JOINT DEVICE (IMPLANTABLE): HCPCS | Performed by: ORTHOPAEDIC SURGERY

## 2024-08-05 PROCEDURE — 82948 REAGENT STRIP/BLOOD GLUCOSE: CPT

## 2024-08-05 PROCEDURE — 25010000002 ROPIVACAINE PER 1 MG: Performed by: ORTHOPAEDIC SURGERY

## 2024-08-05 PROCEDURE — 25010000002 DEXAMETHASONE SODIUM PHOSPHATE 20 MG/5ML SOLUTION: Performed by: NURSE ANESTHETIST, CERTIFIED REGISTERED

## 2024-08-05 PROCEDURE — 25010000002 FENTANYL CITRATE (PF) 100 MCG/2ML SOLUTION: Performed by: NURSE ANESTHETIST, CERTIFIED REGISTERED

## 2024-08-05 PROCEDURE — 25810000003 SODIUM CHLORIDE 0.9 % SOLUTION: Performed by: ORTHOPAEDIC SURGERY

## 2024-08-05 PROCEDURE — 25010000002 ONDANSETRON PER 1 MG: Performed by: NURSE ANESTHETIST, CERTIFIED REGISTERED

## 2024-08-05 PROCEDURE — 25010000002 CEFAZOLIN PER 500 MG: Performed by: ORTHOPAEDIC SURGERY

## 2024-08-05 PROCEDURE — 25010000002 VANCOMYCIN 1 G RECONSTITUTED SOLUTION: Performed by: ORTHOPAEDIC SURGERY

## 2024-08-05 PROCEDURE — 25010000002 PROPOFOL 200 MG/20ML EMULSION: Performed by: NURSE ANESTHETIST, CERTIFIED REGISTERED

## 2024-08-05 PROCEDURE — 97162 PT EVAL MOD COMPLEX 30 MIN: CPT

## 2024-08-05 PROCEDURE — 25010000002 EPINEPHRINE 1 MG/ML SOLUTION 30 ML VIAL: Performed by: ORTHOPAEDIC SURGERY

## 2024-08-05 PROCEDURE — 25010000002 HYDROMORPHONE PER 4 MG: Performed by: NURSE ANESTHETIST, CERTIFIED REGISTERED

## 2024-08-05 PROCEDURE — 97530 THERAPEUTIC ACTIVITIES: CPT

## 2024-08-05 PROCEDURE — 25010000002 KETOROLAC TROMETHAMINE PER 15 MG: Performed by: ORTHOPAEDIC SURGERY

## 2024-08-05 PROCEDURE — 63710000001 INSULIN LISPRO (HUMAN) PER 5 UNITS: Performed by: INTERNAL MEDICINE

## 2024-08-05 PROCEDURE — 73560 X-RAY EXAM OF KNEE 1 OR 2: CPT

## 2024-08-05 PROCEDURE — C1713 ANCHOR/SCREW BN/BN,TIS/BN: HCPCS | Performed by: ORTHOPAEDIC SURGERY

## 2024-08-05 PROCEDURE — 25010000002 CLONIDINE PER 1 MG: Performed by: ORTHOPAEDIC SURGERY

## 2024-08-05 PROCEDURE — 25010000002 SUGAMMADEX 200 MG/2ML SOLUTION: Performed by: NURSE ANESTHETIST, CERTIFIED REGISTERED

## 2024-08-05 DEVICE — PALACOS® R IS A FAST-CURING, RADIOPAQUE, POLY(METHYL METHACRYLATE)-BASED BONE CEMENT.PALACOS ® R CONTAINS THE X-RAY CONTRAST MEDIUM ZIRCONIUM DIOXIDE. TO IMPROVE VISIBILITY IN THE SURGICAL FIELD PALACOS ® R HAS BEEN COLOURED WITH CHLOROPHYLL (E141). THE BONE CEMENT IS PREPARED DIRECTLY BEFORE USE BY MIXING A POLYMER POWDER COMPONENT WITH A LIQUID MONOMER COMPONENT. A DUCTILE DOUGH FORMS WHICH CURES WITHIN A FEW MINUTES.
Type: IMPLANTABLE DEVICE | Site: KNEE | Status: FUNCTIONAL
Brand: PALACOS®

## 2024-08-05 DEVICE — IMPLANTABLE DEVICE: Type: IMPLANTABLE DEVICE | Status: FUNCTIONAL

## 2024-08-05 DEVICE — JOURNEY TIBIAL BASEPLATE NONPOROUS                                    LT SZ 2
Type: IMPLANTABLE DEVICE | Site: KNEE | Status: FUNCTIONAL
Brand: JOURNEY

## 2024-08-05 DEVICE — JOURNEY II BCS FEMORAL OXINIUM                                    LEFT SIZE 3
Type: IMPLANTABLE DEVICE | Site: KNEE | Status: FUNCTIONAL
Brand: JOURNEY

## 2024-08-05 DEVICE — JOURNEY II BCS XLPE ARTICULAR                                    INSERT SIZE 1-2 LEFT 10MM
Type: IMPLANTABLE DEVICE | Site: KNEE | Status: FUNCTIONAL
Brand: JOURNEY

## 2024-08-05 DEVICE — DEV CONTRL TISS STRATAFIX SPIRAL MNCRYL UD 3/0 PLS 30CM: Type: IMPLANTABLE DEVICE | Site: KNEE | Status: FUNCTIONAL

## 2024-08-05 DEVICE — JOURNEY 7.5 ROUND RESURF PAT 32MM STANDARD
Type: IMPLANTABLE DEVICE | Site: KNEE | Status: FUNCTIONAL
Brand: JOURNEY

## 2024-08-05 DEVICE — DEV CONTRL TISS STRATAFIX SYMM PDS PLUS VIL CT-1 60CM: Type: IMPLANTABLE DEVICE | Site: KNEE | Status: FUNCTIONAL

## 2024-08-05 RX ORDER — DOCUSATE SODIUM 100 MG/1
100 CAPSULE, LIQUID FILLED ORAL 2 TIMES DAILY PRN
Status: DISCONTINUED | OUTPATIENT
Start: 2024-08-05 | End: 2024-08-06 | Stop reason: HOSPADM

## 2024-08-05 RX ORDER — MIDAZOLAM HYDROCHLORIDE 1 MG/ML
1 INJECTION INTRAMUSCULAR; INTRAVENOUS
Status: DISCONTINUED | OUTPATIENT
Start: 2024-08-05 | End: 2024-08-05 | Stop reason: HOSPADM

## 2024-08-05 RX ORDER — SODIUM CHLORIDE, SODIUM LACTATE, POTASSIUM CHLORIDE, CALCIUM CHLORIDE 600; 310; 30; 20 MG/100ML; MG/100ML; MG/100ML; MG/100ML
9 INJECTION, SOLUTION INTRAVENOUS CONTINUOUS
Status: DISCONTINUED | OUTPATIENT
Start: 2024-08-05 | End: 2024-08-06 | Stop reason: HOSPADM

## 2024-08-05 RX ORDER — NALOXONE HCL 0.4 MG/ML
0.2 VIAL (ML) INJECTION AS NEEDED
Status: DISCONTINUED | OUTPATIENT
Start: 2024-08-05 | End: 2024-08-05 | Stop reason: HOSPADM

## 2024-08-05 RX ORDER — VANCOMYCIN HYDROCHLORIDE 1 G/20ML
INJECTION, POWDER, LYOPHILIZED, FOR SOLUTION INTRAVENOUS AS NEEDED
Status: DISCONTINUED | OUTPATIENT
Start: 2024-08-05 | End: 2024-08-05 | Stop reason: HOSPADM

## 2024-08-05 RX ORDER — OXYCODONE AND ACETAMINOPHEN 7.5; 325 MG/1; MG/1
2 TABLET ORAL EVERY 4 HOURS PRN
Status: DISCONTINUED | OUTPATIENT
Start: 2024-08-05 | End: 2024-08-06 | Stop reason: HOSPADM

## 2024-08-05 RX ORDER — MELOXICAM 15 MG/1
15 TABLET ORAL DAILY
Status: DISCONTINUED | OUTPATIENT
Start: 2024-08-05 | End: 2024-08-06 | Stop reason: HOSPADM

## 2024-08-05 RX ORDER — HYDRALAZINE HYDROCHLORIDE 20 MG/ML
5 INJECTION INTRAMUSCULAR; INTRAVENOUS
Status: DISCONTINUED | OUTPATIENT
Start: 2024-08-05 | End: 2024-08-05 | Stop reason: HOSPADM

## 2024-08-05 RX ORDER — ACETAMINOPHEN 500 MG
1000 TABLET ORAL ONCE
Status: COMPLETED | OUTPATIENT
Start: 2024-08-05 | End: 2024-08-05

## 2024-08-05 RX ORDER — OXYCODONE HYDROCHLORIDE AND ACETAMINOPHEN 5; 325 MG/1; MG/1
1 TABLET ORAL EVERY 4 HOURS PRN
Qty: 50 TABLET | Refills: 0 | Status: SHIPPED | OUTPATIENT
Start: 2024-08-05

## 2024-08-05 RX ORDER — FENTANYL CITRATE 50 UG/ML
50 INJECTION, SOLUTION INTRAMUSCULAR; INTRAVENOUS
Status: DISCONTINUED | OUTPATIENT
Start: 2024-08-05 | End: 2024-08-05 | Stop reason: HOSPADM

## 2024-08-05 RX ORDER — LISINOPRIL 10 MG/1
10 TABLET ORAL
Status: DISCONTINUED | OUTPATIENT
Start: 2024-08-05 | End: 2024-08-05

## 2024-08-05 RX ORDER — ASPIRIN 81 MG/1
81 TABLET ORAL EVERY 12 HOURS
Qty: 60 TABLET | Refills: 0 | Status: SHIPPED | OUTPATIENT
Start: 2024-08-05 | End: 2024-09-05

## 2024-08-05 RX ORDER — SODIUM CHLORIDE 9 MG/ML
100 INJECTION, SOLUTION INTRAVENOUS CONTINUOUS
Status: DISCONTINUED | OUTPATIENT
Start: 2024-08-05 | End: 2024-08-06 | Stop reason: HOSPADM

## 2024-08-05 RX ORDER — TRANEXAMIC ACID 100 MG/ML
INJECTION, SOLUTION INTRAVENOUS AS NEEDED
Status: DISCONTINUED | OUTPATIENT
Start: 2024-08-05 | End: 2024-08-05 | Stop reason: SURG

## 2024-08-05 RX ORDER — LABETALOL HYDROCHLORIDE 5 MG/ML
5 INJECTION, SOLUTION INTRAVENOUS
Status: DISCONTINUED | OUTPATIENT
Start: 2024-08-05 | End: 2024-08-05 | Stop reason: HOSPADM

## 2024-08-05 RX ORDER — NALOXONE HCL 0.4 MG/ML
0.1 VIAL (ML) INJECTION
Status: DISCONTINUED | OUTPATIENT
Start: 2024-08-05 | End: 2024-08-06 | Stop reason: HOSPADM

## 2024-08-05 RX ORDER — IBUPROFEN 600 MG/1
1 TABLET ORAL
Status: DISCONTINUED | OUTPATIENT
Start: 2024-08-05 | End: 2024-08-06 | Stop reason: HOSPADM

## 2024-08-05 RX ORDER — LIDOCAINE HYDROCHLORIDE 10 MG/ML
0.5 INJECTION, SOLUTION INFILTRATION; PERINEURAL ONCE AS NEEDED
Status: DISCONTINUED | OUTPATIENT
Start: 2024-08-05 | End: 2024-08-05 | Stop reason: HOSPADM

## 2024-08-05 RX ORDER — DEXAMETHASONE SODIUM PHOSPHATE 4 MG/ML
INJECTION, SOLUTION INTRA-ARTICULAR; INTRALESIONAL; INTRAMUSCULAR; INTRAVENOUS; SOFT TISSUE AS NEEDED
Status: DISCONTINUED | OUTPATIENT
Start: 2024-08-05 | End: 2024-08-05 | Stop reason: SURG

## 2024-08-05 RX ORDER — IPRATROPIUM BROMIDE AND ALBUTEROL SULFATE 2.5; .5 MG/3ML; MG/3ML
3 SOLUTION RESPIRATORY (INHALATION) ONCE AS NEEDED
Status: DISCONTINUED | OUTPATIENT
Start: 2024-08-05 | End: 2024-08-05 | Stop reason: HOSPADM

## 2024-08-05 RX ORDER — SODIUM CHLORIDE 0.9 % (FLUSH) 0.9 %
3 SYRINGE (ML) INJECTION EVERY 12 HOURS SCHEDULED
Status: DISCONTINUED | OUTPATIENT
Start: 2024-08-05 | End: 2024-08-05 | Stop reason: HOSPADM

## 2024-08-05 RX ORDER — FLUMAZENIL 0.1 MG/ML
0.2 INJECTION INTRAVENOUS AS NEEDED
Status: DISCONTINUED | OUTPATIENT
Start: 2024-08-05 | End: 2024-08-05 | Stop reason: HOSPADM

## 2024-08-05 RX ORDER — NICOTINE POLACRILEX 4 MG
15 LOZENGE BUCCAL
Status: DISCONTINUED | OUTPATIENT
Start: 2024-08-05 | End: 2024-08-06 | Stop reason: HOSPADM

## 2024-08-05 RX ORDER — ONDANSETRON 2 MG/ML
INJECTION INTRAMUSCULAR; INTRAVENOUS AS NEEDED
Status: DISCONTINUED | OUTPATIENT
Start: 2024-08-05 | End: 2024-08-05 | Stop reason: SURG

## 2024-08-05 RX ORDER — ASPIRIN 81 MG/1
81 TABLET ORAL EVERY 12 HOURS SCHEDULED
Status: DISCONTINUED | OUTPATIENT
Start: 2024-08-06 | End: 2024-08-06 | Stop reason: HOSPADM

## 2024-08-05 RX ORDER — CITALOPRAM 20 MG/1
20 TABLET ORAL DAILY
Status: DISCONTINUED | OUTPATIENT
Start: 2024-08-05 | End: 2024-08-06 | Stop reason: HOSPADM

## 2024-08-05 RX ORDER — PROMETHAZINE HYDROCHLORIDE 25 MG/1
25 SUPPOSITORY RECTAL ONCE AS NEEDED
Status: DISCONTINUED | OUTPATIENT
Start: 2024-08-05 | End: 2024-08-05 | Stop reason: HOSPADM

## 2024-08-05 RX ORDER — ONDANSETRON 4 MG/1
4 TABLET, FILM COATED ORAL EVERY 6 HOURS PRN
Qty: 30 TABLET | Refills: 0 | Status: SHIPPED | OUTPATIENT
Start: 2024-08-05

## 2024-08-05 RX ORDER — DROPERIDOL 2.5 MG/ML
0.62 INJECTION, SOLUTION INTRAMUSCULAR; INTRAVENOUS
Status: DISCONTINUED | OUTPATIENT
Start: 2024-08-05 | End: 2024-08-05 | Stop reason: HOSPADM

## 2024-08-05 RX ORDER — DIPHENHYDRAMINE HYDROCHLORIDE 50 MG/ML
25 INJECTION INTRAMUSCULAR; INTRAVENOUS EVERY 6 HOURS PRN
Status: DISCONTINUED | OUTPATIENT
Start: 2024-08-05 | End: 2024-08-06 | Stop reason: HOSPADM

## 2024-08-05 RX ORDER — FENTANYL CITRATE 50 UG/ML
INJECTION, SOLUTION INTRAMUSCULAR; INTRAVENOUS AS NEEDED
Status: DISCONTINUED | OUTPATIENT
Start: 2024-08-05 | End: 2024-08-05 | Stop reason: SURG

## 2024-08-05 RX ORDER — CEFAZOLIN SODIUM 1 G/3ML
INJECTION, POWDER, FOR SOLUTION INTRAMUSCULAR; INTRAVENOUS AS NEEDED
Status: DISCONTINUED | OUTPATIENT
Start: 2024-08-05 | End: 2024-08-05 | Stop reason: HOSPADM

## 2024-08-05 RX ORDER — PROPOFOL 10 MG/ML
INJECTION, EMULSION INTRAVENOUS AS NEEDED
Status: DISCONTINUED | OUTPATIENT
Start: 2024-08-05 | End: 2024-08-05 | Stop reason: SURG

## 2024-08-05 RX ORDER — OXYCODONE AND ACETAMINOPHEN 7.5; 325 MG/1; MG/1
1 TABLET ORAL EVERY 4 HOURS PRN
Status: DISCONTINUED | OUTPATIENT
Start: 2024-08-05 | End: 2024-08-05 | Stop reason: HOSPADM

## 2024-08-05 RX ORDER — ROCURONIUM BROMIDE 10 MG/ML
INJECTION, SOLUTION INTRAVENOUS AS NEEDED
Status: DISCONTINUED | OUTPATIENT
Start: 2024-08-05 | End: 2024-08-05 | Stop reason: SURG

## 2024-08-05 RX ORDER — TRANEXAMIC ACID 100 MG/ML
INJECTION, SOLUTION INTRAVENOUS AS NEEDED
Status: DISCONTINUED | OUTPATIENT
Start: 2024-08-05 | End: 2024-08-05 | Stop reason: HOSPADM

## 2024-08-05 RX ORDER — CELECOXIB 200 MG/1
200 CAPSULE ORAL ONCE
Status: COMPLETED | OUTPATIENT
Start: 2024-08-05 | End: 2024-08-05

## 2024-08-05 RX ORDER — ONDANSETRON 2 MG/ML
4 INJECTION INTRAMUSCULAR; INTRAVENOUS EVERY 6 HOURS PRN
Status: DISCONTINUED | OUTPATIENT
Start: 2024-08-05 | End: 2024-08-06 | Stop reason: HOSPADM

## 2024-08-05 RX ORDER — VALSARTAN 160 MG/1
160 TABLET ORAL DAILY
Status: DISCONTINUED | OUTPATIENT
Start: 2024-08-05 | End: 2024-08-06 | Stop reason: HOSPADM

## 2024-08-05 RX ORDER — LIDOCAINE HYDROCHLORIDE 20 MG/ML
INJECTION, SOLUTION EPIDURAL; INFILTRATION; INTRACAUDAL; PERINEURAL AS NEEDED
Status: DISCONTINUED | OUTPATIENT
Start: 2024-08-05 | End: 2024-08-05 | Stop reason: SURG

## 2024-08-05 RX ORDER — PROMETHAZINE HYDROCHLORIDE 25 MG/1
25 TABLET ORAL ONCE AS NEEDED
Status: DISCONTINUED | OUTPATIENT
Start: 2024-08-05 | End: 2024-08-05 | Stop reason: HOSPADM

## 2024-08-05 RX ORDER — FAMOTIDINE 20 MG/1
40 TABLET, FILM COATED ORAL DAILY
Status: DISCONTINUED | OUTPATIENT
Start: 2024-08-05 | End: 2024-08-06 | Stop reason: HOSPADM

## 2024-08-05 RX ORDER — DIPHENHYDRAMINE HYDROCHLORIDE 50 MG/ML
12.5 INJECTION INTRAMUSCULAR; INTRAVENOUS
Status: DISCONTINUED | OUTPATIENT
Start: 2024-08-05 | End: 2024-08-05 | Stop reason: HOSPADM

## 2024-08-05 RX ORDER — ONDANSETRON 4 MG/1
4 TABLET, ORALLY DISINTEGRATING ORAL EVERY 6 HOURS PRN
Status: DISCONTINUED | OUTPATIENT
Start: 2024-08-05 | End: 2024-08-06 | Stop reason: HOSPADM

## 2024-08-05 RX ORDER — EPHEDRINE SULFATE 50 MG/ML
5 INJECTION, SOLUTION INTRAVENOUS ONCE AS NEEDED
Status: DISCONTINUED | OUTPATIENT
Start: 2024-08-05 | End: 2024-08-05 | Stop reason: HOSPADM

## 2024-08-05 RX ORDER — HYDROCODONE BITARTRATE AND ACETAMINOPHEN 5; 325 MG/1; MG/1
1 TABLET ORAL ONCE AS NEEDED
Status: COMPLETED | OUTPATIENT
Start: 2024-08-05 | End: 2024-08-05

## 2024-08-05 RX ORDER — HYDROMORPHONE HYDROCHLORIDE 1 MG/ML
0.5 INJECTION, SOLUTION INTRAMUSCULAR; INTRAVENOUS; SUBCUTANEOUS
Status: DISCONTINUED | OUTPATIENT
Start: 2024-08-05 | End: 2024-08-05 | Stop reason: HOSPADM

## 2024-08-05 RX ORDER — OXYCODONE AND ACETAMINOPHEN 7.5; 325 MG/1; MG/1
1 TABLET ORAL EVERY 4 HOURS PRN
Status: DISCONTINUED | OUTPATIENT
Start: 2024-08-05 | End: 2024-08-06 | Stop reason: HOSPADM

## 2024-08-05 RX ORDER — SODIUM CHLORIDE 0.9 % (FLUSH) 0.9 %
3-10 SYRINGE (ML) INJECTION AS NEEDED
Status: DISCONTINUED | OUTPATIENT
Start: 2024-08-05 | End: 2024-08-05 | Stop reason: HOSPADM

## 2024-08-05 RX ORDER — ONDANSETRON 2 MG/ML
4 INJECTION INTRAMUSCULAR; INTRAVENOUS ONCE AS NEEDED
Status: DISCONTINUED | OUTPATIENT
Start: 2024-08-05 | End: 2024-08-05 | Stop reason: HOSPADM

## 2024-08-05 RX ORDER — DIPHENHYDRAMINE HCL 25 MG
50 CAPSULE ORAL EVERY 6 HOURS PRN
Status: DISCONTINUED | OUTPATIENT
Start: 2024-08-05 | End: 2024-08-06 | Stop reason: HOSPADM

## 2024-08-05 RX ORDER — DEXTROSE MONOHYDRATE 25 G/50ML
25 INJECTION, SOLUTION INTRAVENOUS
Status: DISCONTINUED | OUTPATIENT
Start: 2024-08-05 | End: 2024-08-06 | Stop reason: HOSPADM

## 2024-08-05 RX ORDER — INSULIN LISPRO 100 [IU]/ML
2-9 INJECTION, SOLUTION INTRAVENOUS; SUBCUTANEOUS
Status: DISCONTINUED | OUTPATIENT
Start: 2024-08-05 | End: 2024-08-06 | Stop reason: HOSPADM

## 2024-08-05 RX ORDER — MIDAZOLAM HYDROCHLORIDE 1 MG/ML
0.5 INJECTION INTRAMUSCULAR; INTRAVENOUS
Status: DISCONTINUED | OUTPATIENT
Start: 2024-08-05 | End: 2024-08-05 | Stop reason: HOSPADM

## 2024-08-05 RX ADMIN — SODIUM CHLORIDE 2000 MG: 900 INJECTION INTRAVENOUS at 14:38

## 2024-08-05 RX ADMIN — MELOXICAM 15 MG: 15 TABLET ORAL at 12:18

## 2024-08-05 RX ADMIN — OXYCODONE AND ACETAMINOPHEN 2 TABLET: 7.5; 325 TABLET ORAL at 22:08

## 2024-08-05 RX ADMIN — SODIUM CHLORIDE 100 ML/HR: 9 INJECTION, SOLUTION INTRAVENOUS at 12:18

## 2024-08-05 RX ADMIN — ROCURONIUM BROMIDE 40 MG: 50 INJECTION INTRAVENOUS at 07:47

## 2024-08-05 RX ADMIN — HYDROMORPHONE HYDROCHLORIDE 0.5 MG: 1 INJECTION, SOLUTION INTRAMUSCULAR; INTRAVENOUS; SUBCUTANEOUS at 10:04

## 2024-08-05 RX ADMIN — OXYCODONE AND ACETAMINOPHEN 2 TABLET: 7.5; 325 TABLET ORAL at 14:13

## 2024-08-05 RX ADMIN — PROPOFOL 30 MG: 10 INJECTION, EMULSION INTRAVENOUS at 08:40

## 2024-08-05 RX ADMIN — SUGAMMADEX 200 MG: 100 INJECTION, SOLUTION INTRAVENOUS at 08:56

## 2024-08-05 RX ADMIN — PROPOFOL 170 MG: 10 INJECTION, EMULSION INTRAVENOUS at 07:47

## 2024-08-05 RX ADMIN — OXYCODONE HYDROCHLORIDE AND ACETAMINOPHEN 1 TABLET: 7.5; 325 TABLET ORAL at 18:35

## 2024-08-05 RX ADMIN — LIDOCAINE HYDROCHLORIDE 80 MG: 20 INJECTION, SOLUTION EPIDURAL; INFILTRATION; INTRACAUDAL at 07:47

## 2024-08-05 RX ADMIN — ACETAMINOPHEN 1000 MG: 500 TABLET ORAL at 06:29

## 2024-08-05 RX ADMIN — INSULIN LISPRO 2 UNITS: 100 INJECTION, SOLUTION INTRAVENOUS; SUBCUTANEOUS at 21:06

## 2024-08-05 RX ADMIN — SODIUM CHLORIDE, POTASSIUM CHLORIDE, SODIUM LACTATE AND CALCIUM CHLORIDE 9 ML/HR: 600; 310; 30; 20 INJECTION, SOLUTION INTRAVENOUS at 06:27

## 2024-08-05 RX ADMIN — DEXAMETHASONE SODIUM PHOSPHATE 4 MG: 4 INJECTION, SOLUTION INTRAMUSCULAR; INTRAVENOUS at 08:09

## 2024-08-05 RX ADMIN — ROCURONIUM BROMIDE 10 MG: 50 INJECTION INTRAVENOUS at 08:40

## 2024-08-05 RX ADMIN — TRANEXAMIC ACID 1000 MG: 1 INJECTION, SOLUTION INTRAVENOUS at 07:56

## 2024-08-05 RX ADMIN — FENTANYL CITRATE 50 MCG: 50 INJECTION, SOLUTION INTRAMUSCULAR; INTRAVENOUS at 08:02

## 2024-08-05 RX ADMIN — CELECOXIB 200 MG: 200 CAPSULE ORAL at 06:29

## 2024-08-05 RX ADMIN — SUGAMMADEX 100 MG: 100 INJECTION, SOLUTION INTRAVENOUS at 09:00

## 2024-08-05 RX ADMIN — SODIUM CHLORIDE 2000 MG: 900 INJECTION INTRAVENOUS at 07:36

## 2024-08-05 RX ADMIN — VALSARTAN 160 MG: 160 TABLET, FILM COATED ORAL at 14:35

## 2024-08-05 RX ADMIN — ONDANSETRON 4 MG: 2 INJECTION INTRAMUSCULAR; INTRAVENOUS at 08:09

## 2024-08-05 RX ADMIN — INSULIN LISPRO 2 UNITS: 100 INJECTION, SOLUTION INTRAVENOUS; SUBCUTANEOUS at 17:22

## 2024-08-05 RX ADMIN — SODIUM CHLORIDE 2000 MG: 900 INJECTION INTRAVENOUS at 22:07

## 2024-08-05 RX ADMIN — HYDROCODONE BITARTRATE AND ACETAMINOPHEN 1 TABLET: 5; 325 TABLET ORAL at 10:04

## 2024-08-05 RX ADMIN — FENTANYL CITRATE 50 MCG: 50 INJECTION, SOLUTION INTRAMUSCULAR; INTRAVENOUS at 07:45

## 2024-08-05 NOTE — CONSULTS
Patient Name:  Christie Hollis  YOB: 1960  MRN:  [id number]  Admit Date:  8/5/2024  Patient Care Team:  Pallavi Chaudhry MD as PCP - General  Pallavi Chaudhry MD as PCP - Family Medicine      Subjective   History Present Illness     Referring Provider: Dr. Irizarry  Reason for Consultation: hypertension, Type 2 Dm    Ms. Hollis is a 64 y.o. non-smoker with a history of HTN, HLD, Type 2 DM, GERD, BUD not on CPAP, and osteoarthritis of the left knee admitted to the orthopedic surgery service at [facility] complaining of following a left total knee arthroplasty done earlier today. I was consulted for evaluation and management of her medical issues above, particularly her hypertension and diabetes.     Review of Systems   All other systems reviewed and are negative.       Personal History     Past Medical History:   Diagnosis Date    Anxiety     Arthritis     Bilateral carotid artery stenosis     6/1/2018    Diabetes mellitus     without complications    GERD (gastroesophageal reflux disease)     Hyperlipidemia     Hypertension     Right knee pain     Sleep apnea     NO MACHINE     Past Surgical History:   Procedure Laterality Date    BRACHIOPLASTY Bilateral     COLONOSCOPY  2013    LAPAROSCOPIC GASTRIC BANDING  01/09/2009    TOTAL KNEE ARTHROPLASTY Right 10/16/2023    Procedure: RIGHT TOTAL KNEE ARTHROPLASTY WITH CORI ROBOT;  Surgeon: Vini Irizarry II, MD;  Location: [facility] OR [facility] [place];  Service: Robotics - Ortho;  Laterality: Right;     Family History   Problem Relation Age of Onset    Cancer Mother     Cancer Father     Aortic aneurysm Father     Malig Hyperthermia Neg Hx      Social History     Tobacco Use    Smoking status: Never    Smokeless tobacco: Never   Vaping Use    Vaping status: Never Used   Substance Use Topics    Alcohol use: Yes     Comment: OCCASIONALLY    Drug use: Never     No current facility-administered medications on file prior to encounter.     Current  Outpatient Medications on File Prior to Encounter   Medication Sig Dispense Refill    atorvastatin (LIPITOR) 10 MG tablet Take 1 tablet by mouth Daily.      citalopram (CeleXA) 20 MG tablet Take 1 tablet by mouth Daily. Indications: Major Depressive Disorder      lansoprazole (PREVACID) 15 MG capsule Take 1 capsule by mouth Daily. Indications: Heartburn      Loratadine 10 MG capsule Take 1 capsule by mouth Every Night. Indications: Hayfever      metFORMIN (GLUCOPHAGE) 500 MG tablet Take 2 tablets by mouth 2 (Two) Times a Day With Meals. Indications: Type 2 Diabetes      pioglitazone (ACTOS) 45 MG tablet Take 1 tablet by mouth Daily. Indications: Type 2 Diabetes      polyethylene glycol (MIRALAX) 17 g packet Take 17 g by mouth Daily As Needed.      Pseudoephedrine-guaiFENesin (MUCINEX D PO) Take  by mouth As Needed. Indications: allergies      rosuvastatin (CRESTOR) 20 MG tablet Take 1 tablet by mouth Every Night. Indications: High Amount of Triglycerides in the Blood      valsartan (DIOVAN) 160 MG tablet Take 1 tablet by mouth Daily. Indications: High Blood Pressure Disorder      [DISCONTINUED] benazepril (LOTENSIN) 20 MG tablet Take 1 tablet by mouth Daily.      albuterol sulfate  (90 Base) MCG/ACT inhaler Inhale 2 puffs.      aspirin 325 MG tablet Take 1 tablet by mouth Daily. HOLD PER MD GOODSON      azelastine (ASTELIN) 0.1 % nasal spray 2 sprays into the nostril(s) as directed by provider 2 (Two) Times a Day As Needed.      B Complex Vitamins (VITAMIN B COMPLEX PO) Take  by mouth. HOLD PRIOR TO SURG  Indications: supplement      cholecalciferol (VITAMIN D3) 25 MCG (1000 UT) tablet Take 1 tablet by mouth Daily. Indications: Vitamin D Deficiency      citalopram (CeleXA) 40 MG tablet Take 0.5 tablets by mouth Daily.      Coenzyme Q10 (CoQ-10) 200 MG capsule Take  by mouth. HOLD PRIOR TO SURG      IRON CR PO Take  by mouth. HOLD PRIOR TO SURG      KRILL OIL PO Take  by mouth. HOLD PRIOR TO SURG      meloxicam  (MOBIC) 15 MG tablet Take 1 tablet by mouth Daily. HOLD PRIOR TO SURG  Indications: Joint Damage causing Pain and Loss of Function      ondansetron (Zofran) 4 MG tablet Take 1 tablet by mouth Every 6 (Six) Hours As Needed for Nausea or Vomiting. 30 tablet 0    ondansetron (Zofran) 4 MG tablet Take 1 tablet by mouth Every 6 (Six) Hours As Needed for Nausea or Vomiting. 30 tablet 0    oxyCODONE-acetaminophen (PERCOCET) 5-325 MG per tablet Take 1 tablet by mouth Every 4 (Four) Hours As Needed for Severe Pain. 50 tablet 0    oxyCODONE-acetaminophen (PERCOCET) 5-325 MG per tablet Take 1 tablet by mouth Every 4 (Four) Hours As Needed for Severe Pain. 50 tablet 0    Ozempic, 2 MG/DOSE, 8 MG/3ML solution pen-injector Inject 2 mg under the skin into the appropriate area as directed 1 (One) Time Per Week. THURSDAY/HOLD UNTIL AFTER SURGERY/HOLDING AUGUST 1 DOSE.  Indications: Type 2 Diabetes      Vitamin E 180 MG (400 UNIT) capsule capsule Take  by mouth. HOLD PRIOR TO SURG       Allergies   Allergen Reactions    Sulfa Antibiotics Rash       Objective    Objective     Vital Signs  Temp:  [97.9 °F (36.6 °C)-98.6 °F (37 °C)] 98.6 °F (37 °C)  Heart Rate:  [74-95] 93  Resp:  [12-16] 16  BP: (138-159)/(61-75) 143/69  SpO2:  [96 %-100 %] 98 %  on  Flow (L/min):  [2-4] 2;   Device (Oxygen Therapy): room air  There is no height or weight on file to calculate BMI.    Physical Exam  Vitals and nursing note reviewed.   Constitutional:       General: She is not in acute distress.     Appearance: She is not toxic-appearing or diaphoretic.   HENT:      Head: Normocephalic and atraumatic.      Nose: Nose normal.      Mouth/Throat:      Mouth: Mucous membranes are moist.      Pharynx: Oropharynx is clear.   Eyes:      Conjunctiva/sclera: Conjunctivae normal.      Pupils: Pupils are equal, round, and reactive to light.   Cardiovascular:      Rate and Rhythm: Normal rate and regular rhythm.      Pulses: Normal pulses.   Pulmonary:      Effort:  Pulmonary effort is normal.      Breath sounds: Normal breath sounds.   Abdominal:      General: Bowel sounds are normal.      Palpations: Abdomen is soft.      Tenderness: There is no abdominal tenderness.   Musculoskeletal:         General: No swelling or tenderness.      Cervical back: Neck supple.      Comments: Left knee wrapped and dressed, c/d/i   Skin:     General: Skin is warm and dry.      Capillary Refill: Capillary refill takes less than 2 seconds.   Neurological:      General: No focal deficit present.      Mental Status: She is alert and oriented to person, place, and time.   Psychiatric:         Mood and Affect: Mood normal.         Behavior: Behavior normal.       Results Review:  I reviewed the patient's new clinical results.  I reviewed the patient's new imaging results and agree with the interpretation.  I reviewed the patient's other test results and agree with the interpretation    Lab Results (last 24 hours)       Procedure Component Value Units Date/Time    POC Glucose Once [[id number]]  (Normal) Collected: 08/05/24 0603    Specimen: Blood Updated: 08/05/24 0604     Glucose 124 mg/dL             Imaging Results (Last 24 Hours)       Procedure Component Value Units Date/Time    XR Knee 1 or 2 View Left [[id number]] Collected: 08/05/24 0941     Updated: 08/05/24 0944    Narrative:      XR KNEE 1 OR 2 VW LEFT-     POSTOP PORTABLE 2 VIEWS LEFT KNEE     CLINICAL INFORMATION: Post arthroplasty     FINDINGS: Prosthesis is satisfactory in position. A complicating process  is not identified.     This report was finalized on 8/5/2024 9:41 AM by Dr. Baljit Thomason M.D  on Workstation: [id number]                   No orders to display        Assessment/Plan     Active Hospital Problems    Diagnosis  POA    **Status post knee replacement [Z96.659]  Not Applicable    Hyperlipidemia [E78.5]  Yes    GERD without esophagitis [K21.9]  Yes    BUD (obstructive sleep apnea) [G47.33]  Yes    HTN (hypertension)  [I10]  Yes    Type 2 diabetes mellitus, without long-term current use of insulin [E11.9]  Yes      Resolved Hospital Problems   No resolved problems to display.   Osteoarthritis of the Left Knee  - s/p total knee arthroplasty 8/5/24 with Dr. Irizarry  - management per primary team    Type 2 DM  - BG acceptable  - hold metformin and actos for now  - cover with ssi/hypoglycemia protocol    HTN  - BG is slightly elevated  - continue valsartan  - check BMP in AM    GERD  - symptoms controlled  - continue famotidine    BUD  - not on CPAP  - encourage pulmonary toilet and supplement oxygen as needed    I discussed the patient's findings and my recommendations with patient and nursing staff.    VTE Prophylaxis -  ASA per primary team .       Rico Jacobson MD  [facility]  08/05/24  12:36 EDT

## 2024-08-05 NOTE — ANESTHESIA POSTPROCEDURE EVALUATION
Patient: Christie Hollis    Procedure Summary       Date: 08/05/24 Room / Location:  [facility] [address] [place] [address] [facility]    Anesthesia Start: 0740 Anesthesia Stop: 0908    Procedure: LEFT TOTAL KNEE ARTHROPLASTY CORI ROBOT (Left: Knee) Diagnosis:     Surgeons: Vini Irizarry II, MD Provider: Wil Turcios MD    Anesthesia Type: general ASA Status: 3            Anesthesia Type: general    Vitals  Vitals Value Taken Time   /65 08/05/24 0945   Temp 36.8 °C (98.3 °F) 08/05/24 0910   Pulse 94 08/05/24 0959   Resp 16 08/05/24 0945   SpO2 100 % 08/05/24 0959   Vitals shown include unfiled device data.        Anesthesia Post Evaluation

## 2024-08-05 NOTE — ANESTHESIA PREPROCEDURE EVALUATION
Anesthesia Evaluation     Patient summary reviewed and Nursing notes reviewed   no history of anesthetic complications:   NPO Solid Status: > 8 hours  NPO Liquid Status: > 2 hours           Airway   Mallampati: II  TM distance: >3 FB  Neck ROM: full  Dental      Pulmonary    (+) ,sleep apnea  Cardiovascular     ECG reviewed    (+) hypertension,  carotid artery disease (50% stenosis of bilateral carotid arteries) carotid bilateral    ROS comment: EKG normal    Neuro/Psych  (+) psychiatric history Anxiety  GI/Hepatic/Renal/Endo    (+) obesity, GERD, diabetes mellitus    Musculoskeletal     Abdominal   (+) obese   Substance History      OB/GYN          Other                          Anesthesia Plan    ASA 3     general     intravenous induction     Anesthetic plan, risks, benefits, and alternatives have been provided, discussed and informed consent has been obtained with: patient.        CODE STATUS:

## 2024-08-05 NOTE — PLAN OF CARE
Dr. Camelia KINNEY aware of patients refusal of HS Lovenox. No new orders at this time.    Goal Outcome Evaluation:  Plan of Care Reviewed With: patient, spouse        Progress: improving  Outcome Evaluation: Pt is a 63 y/o F POD0 L TKA. Pt reports being independent at baseline without use of AD, owns a RW, and lives with her  in a house w/ 2 MIGUEL and BHR. Today, pt was SBA for bed mobility, STS, tolieting (independent w/ pericare), and ambulation total of 124'. Pt demos typical post-op antaglic mechanics, but was able to progress to a step-through pattern. No knee buckling, SOB, dizziness or LOB. Pt performed TKA protocol ther-ex for 10 reps demoing L knee AROM of 3-80deg. Pt edu on HEP, activity recs, and safety/sequencing w/ ambulation; she v/u. Pt will benefit from skilled PT services to address her typical post-op impaired gait, strength, ROM and endurance. SBAR w/ RN following session. Rec home w/ HH PT at [place].      Anticipated Discharge Disposition (PT): home with home health, home with assist

## 2024-08-05 NOTE — PLAN OF CARE
Goal Outcome Evaluation:  Plan of Care Reviewed With: patient                PT POD0 R total knee, vss, room air, asstx1, plans to dc home.

## 2024-08-05 NOTE — H&P
Orthopaedic Surgery  History & Physical For Elective Total Knee  Dr. JOE Irizarry II  [phone]    HPI:  Patient is a 64 y.o. Not  or  female who presents with End-stage arthritis of the left knee. They failed conservative treatment of their knee pain and a thorough discussion of the risks and benefits of surgery was had. The patient wishes to continue with elective total knee replacement, they were scheduled and are here for surgery. They did get medical clearance as well as a thorough preoperative workup.    MEDICAL HISTORY  Past Medical History:   Diagnosis Date   • Anxiety    • Arthritis    • Bilateral carotid artery stenosis     6/1/2018   • Diabetes mellitus     without complications   • GERD (gastroesophageal reflux disease)    • Hyperlipidemia    • Hypertension    • Right knee pain    • Sleep apnea     NO MACHINE     Past Surgical History:   Procedure Laterality Date   • BRACHIOPLASTY Bilateral    • COLONOSCOPY  2013   • LAPAROSCOPIC GASTRIC BANDING  01/09/2009   • TOTAL KNEE ARTHROPLASTY Right 10/16/2023    Procedure: RIGHT TOTAL KNEE ARTHROPLASTY WITH CORI ROBOT;  Surgeon: Vini Irizarry II, MD;  Location: [facility];  Service: Robotics - Ortho;  Laterality: Right;     Prior to Admission medications    Medication Sig Start Date End Date Taking? Authorizing Provider   atorvastatin (LIPITOR) 10 MG tablet Take 1 tablet by mouth Daily.   Yes ProviderSelena MD   benazepril (LOTENSIN) 20 MG tablet Take 1 tablet by mouth Daily.   Yes ProviderSelena MD   citalopram (CeleXA) 20 MG tablet Take 1 tablet by mouth Daily. Indications: Major Depressive Disorder   Yes ProviderSelena MD   lansoprazole (PREVACID) 15 MG capsule Take 1 capsule by mouth Daily. Indications: Heartburn   Yes ProviderSelena MD   Loratadine 10 MG capsule Take 1 capsule by mouth Every Night. Indications: Hayfever   Yes ProviderSelena MD   metFORMIN (GLUCOPHAGE) 500 MG tablet Take  2 tablets by mouth 2 (Two) Times a Day With Meals. Indications: Type 2 Diabetes   Yes Selena Mondragon MD   pioglitazone (ACTOS) 45 MG tablet Take 1 tablet by mouth Daily. Indications: Type 2 Diabetes   Yes Selena Mondragon MD   polyethylene glycol (MIRALAX) 17 g packet Take 17 g by mouth Daily As Needed. 10/20/23  Yes Selena Mondragon MD   Pseudoephedrine-guaiFENesin (MUCINEX D PO) Take  by mouth As Needed. Indications: allergies   Yes Selena Mondragon MD   rosuvastatin (CRESTOR) 20 MG tablet Take 1 tablet by mouth Every Night. Indications: High Amount of Triglycerides in the Blood   Yes Selena Mondragon MD   valsartan (DIOVAN) 160 MG tablet Take 1 tablet by mouth Daily. Indications: High Blood Pressure Disorder   Yes Selena Mondragon MD   albuterol sulfate  (90 Base) MCG/ACT inhaler Inhale 2 puffs. 4/1/24   Selena Mondragon MD   aspirin 325 MG tablet Take 1 tablet by mouth Daily. HOLD PER MD INSTR    Selena Mondragon MD   azelastine (ASTELIN) 0.1 % nasal spray 2 sprays into the nostril(s) as directed by provider 2 (Two) Times a Day As Needed. 4/1/24   Selena Mondragon MD   B Complex Vitamins (VITAMIN B COMPLEX PO) Take  by mouth. HOLD PRIOR TO SURG  Indications: supplement    Selena Mondragon MD   cholecalciferol (VITAMIN D3) 25 MCG (1000 UT) tablet Take 1 tablet by mouth Daily. Indications: Vitamin D Deficiency    Selena Mondragon MD   citalopram (CeleXA) 40 MG tablet Take 0.5 tablets by mouth Daily.    Selena Mondragon MD   Coenzyme Q10 (CoQ-10) 200 MG capsule Take  by mouth. HOLD PRIOR TO SURG    Selena Mondragon MD   IRON CR PO Take  by mouth. HOLD PRIOR TO SURG    Selena Mondragon MD   KRILL OIL PO Take  by mouth. HOLD PRIOR TO SURG    Selena Mondragon MD   meloxicam (MOBIC) 15 MG tablet Take 1 tablet by mouth Daily. HOLD PRIOR TO SURG  Indications: Joint Damage causing Pain and Loss of Function    Selena Mondragon MD    ondansetron (Zofran) 4 MG tablet Take 1 tablet by mouth Every 6 (Six) Hours As Needed for Nausea or Vomiting. 10/16/23   Vini Irizarry II, MD   ondansetron (Zofran) 4 MG tablet Take 1 tablet by mouth Every 6 (Six) Hours As Needed for Nausea or Vomiting. 10/17/23   Vini Irizarry II, MD   oxyCODONE-acetaminophen (PERCOCET) 5-325 MG per tablet Take 1 tablet by mouth Every 4 (Four) Hours As Needed for Severe Pain. 10/16/23   Vini Irizarry II, MD   oxyCODONE-acetaminophen (PERCOCET) 5-325 MG per tablet Take 1 tablet by mouth Every 4 (Four) Hours As Needed for Severe Pain. 10/17/23   Vini Irizarry II, MD   Ozempic, 2 MG/DOSE, 8 MG/3ML solution pen-injector Inject 2 mg under the skin into the appropriate area as directed 1 (One) Time Per Week. THURSDAY/HOLD UNTIL AFTER SURGERY/HOLDING AUGUST 1 DOSE.  Indications: Type 2 Diabetes    Provider, MD Selena   Vitamin E 180 MG (400 UNIT) capsule capsule Take  by mouth. HOLD PRIOR TO SURG    ProviderSelena MD     Allergies   Allergen Reactions   • Sulfa Antibiotics Rash     Most Recent Immunizations   Administered Date(s) Administered   • COVID-19 (MODERNA) 1st,2nd,3rd Dose Monovalent 03/30/2021   • COVID-19 (MODERNA) BIVALENT 12+YRS 09/30/2022   • COVID-19 (MODERNA) Monovalent Original Booster 06/07/2022   • COVID-19 F23 (PFIZER) 12YRS+ (COMIRNATY) 10/06/2023   • Flublok 18+yrs 10/14/2021   • Fluzone (or Fluarix & Flulaval for VFC) >6mos 11/05/2018   • Influenza Injectable Mdck Pf Quad 09/30/2022   • Influenza, Unspecified 11/11/2022   • Shingrix 11/06/2020     Social History   History  Tobacco Use  •  Smoking status:  Never  •  Smokeless tobacco:  Never  Substance Use Topics  •  Alcohol use:  Yes      Comment: OCCASIONALLY       Social History   History  Substance and Sexual Activity  Drug Use  Never        REVIEW OF SYSTEMS:  Head: negative for headache  Respiratory: negative for shortness of breath.   Cardiovascular:  negative for chest pain.   Gastrointestinal: negative abdominal pain.   Neurological: negative for LOC  Psychiatric/Behavioral: negative for memory loss.   All other systems reviewed and are negative    VITALS: /64 (BP Location: Left arm, Patient Position: Sitting)   Pulse 74   Temp 98.3 °F (36.8 °C) (Oral)   Resp 16   SpO2 99%  There is no height or weight on file to calculate BMI.    PHYSICAL EXAM:   CONSTITUTIONAL: A&Ox3, No acute distress  LUNGS: Equal chest rise, no shortness of air  CARDIOVASCULAR: palpable peripheral pulses  SKIN: no skin lesions in the area examined  LYMPH: no lymphadenopathy in the area examined  EXTREMITY: Knee  Pulses:  Brisk Capillary Refill  Sensation: Intact to Saphenous, Sural, Deep Peroneal, Superficial Peroneal, and Tibial Nerves and grossly throughout extremity  Motor: 5/5 EHL/FHL/TA/GS motor complexes    RADIOLOGY REVIEW:   XR Chest PA & Lateral    Result Date: 7/30/2024  As described.  This report was finalized on 7/30/2024 3:15 PM by Dr. Gerson Riggins M.D on Workstation: [id number]      XR Knee 1 or 2 View Left    Result Date: 7/30/2024  As described.  This report was finalized on 7/30/2024 3:15 PM by Dr. Gerson Riggins M.D on Workstation: [id number]        LABS:   Results for the past 24 hours:   Recent Results (from the past 24 hour(s))   POC Glucose Once    Collection Time: 08/05/24  6:03 AM    Specimen: Blood   Result Value Ref Range    Glucose 124 70 - 130 mg/dL       IMPRESSION:  Patient is a 64 y.o. Not  or  female with end-stage arthritis of the left knee    PLAN:   Surgery: Elective total knee arthroplasty  Consent: The risks and benefits of operative versus nonoperative treatment were discussed. The patient elected to undergo operative treatment of their knee arthritis. The risks discussed included but were not limited to blood clots, MI, stroke, other medical complications, infection, damage to neurovascular structures, continued pain,  hardware prominence, loss of range of motion, need for further procedures, and and risk of anesthesia..  No guarantees were made   Disposition: Elective left Total Knee Arthroplasty today.    Vini Irizarry II, MD  Orthopaedic Surgery  [facility]

## 2024-08-05 NOTE — ANESTHESIA PROCEDURE NOTES
Airway  Urgency: elective    Date/Time: 8/5/2024 7:49 AM  Airway not difficult    General Information and Staff    Patient location during procedure: OR  CRNA/CAA: Delaney Early CRNA    Indications and Patient Condition  Indications for airway management: airway protection    Preoxygenated: yes  Mask difficulty assessment: 1 - vent by mask    Final Airway Details  Final airway type: endotracheal airway      Successful airway: ETT  Cuffed: yes   Successful intubation technique: direct laryngoscopy  Facilitating devices/methods: intubating stylet  Endotracheal tube insertion site: oral  Blade: Arceo  Blade size: 2  ETT size (mm): 7.0  Cormack-Lehane Classification: grade I - full view of glottis  Placement verified by: chest auscultation and capnometry   Measured from: lips  ETT/EBT  to teeth (cm): 21  Number of attempts at approach: 1  Assessment: lips, teeth, and gum same as pre-op and atraumatic intubation

## 2024-08-05 NOTE — DISCHARGE PLACEMENT REQUEST
"Christie Wong P \"Pat\" (64 y.o. Female)       Date of Birth   1960    Social Security Number       Address   [address] [place] [address]    Home Phone   [phone]    MRN   [id number]       [employer]   [employer]ism    Marital Status                               Admission Date   8/5/24    Admission Type   Elective    Admitting Provider   Vini Irizarry II, MD    Attending Provider   Vini Irizarry II, MD    Department, Room/Bed   [address], P798/1       Discharge Date       Discharge Disposition       Discharge Destination                                 Attending Provider: Vini Irizarry II, MD    Allergies: Sulfa Antibiotics    Isolation: None   Infection: None   Code Status: CPR    Ht: 158.8 cm (62.5\")   Wt: 85.3 kg (188 lb 1.6 oz)    Admission Cmt: None   Principal Problem: Status post knee replacement [Z96.659]                   Active Insurance as of 8/5/2024       Primary Coverage       Payor Plan Insurance Group Employer/Plan Group    [facility] MEDICARE REPLACEMENT [facility] [employer] [facility] [employer] [id number]       Payor Plan Address Payor Plan Phone Number Payor Plan Fax Number Effective Dates    [address] [address]   3/1/2024 - None Entered    [facility] [id number]         Subscriber Name Subscriber Birth Date Member ID       CHRISTIE WONG P 1960 [id number]                     Emergency Contacts        (Rel.) Home Phone Work Phone Mobile Phone    BunnyKeaton (Spouse) [phone] -- [phone]                "

## 2024-08-05 NOTE — THERAPY EVALUATION
Patient Name: Christie Hollis  : 1960    MRN: [id number]                              Today's Date: 2024       Admit Date: 2024    Visit Dx: No diagnosis found.  Patient Active Problem List   Diagnosis    Status post knee replacement    Type 2 diabetes mellitus, without long-term current use of insulin    HTN (hypertension)    Carotid artery stenosis    Hyperlipidemia    GERD without esophagitis    BUD (obstructive sleep apnea)     Past Medical History:   Diagnosis Date    Anxiety     Arthritis     Bilateral carotid artery stenosis     2018    Diabetes mellitus     without complications    GERD (gastroesophageal reflux disease)     Hyperlipidemia     Hypertension     Right knee pain     Sleep apnea     NO MACHINE     Past Surgical History:   Procedure Laterality Date    BRACHIOPLASTY Bilateral     COLONOSCOPY      LAPAROSCOPIC GASTRIC BANDING  2009    TOTAL KNEE ARTHROPLASTY Right 10/16/2023    Procedure: RIGHT TOTAL KNEE ARTHROPLASTY WITH CORI ROBOT;  Surgeon: Vini Irizarry II, MD;  Location: [facility] OR [facility];  Service: Robotics - Ortho;  Laterality: Right;      General Information       Row Name 24 1618          Physical Therapy Time and Intention    Document Type evaluation  -MG     Mode of Treatment individual therapy;physical therapy  -MG       Row Name 24 2781          General Information    Patient Profile Reviewed yes  -MG     Prior Level of Function independent:  No AD. Owns RW.  -MG     Existing Precautions/Restrictions fall  -MG     Barriers to Rehab none identified  -MG       Row Name 24 1353          Living Environment    People in Home spouse  -MG       Row Name 24 1353          Home Main Entrance    Number of Stairs, Main Entrance two  -MG     Stair Railings, Main Entrance railings safe and in good condition;railings on both sides of stairs  -MG       Row Name 24 1729          Stairs Within Home, Primary    Stairs, Within Home,  Primary Has upstairs and basement, but they stay on the main floor.  -MG       Row Name 08/05/24 1356          Cognition    Orientation Status (Cognition) oriented x 4  -MG       Row Name 08/05/24 1356          Safety Issues, Functional Mobility    Safety Issues Affecting Function (Mobility) awareness of need for assistance;insight into deficits/self-awareness  -MG     Impairments Affecting Function (Mobility) endurance/activity tolerance;pain;range of motion (ROM);strength  -MG     Comment, Safety Issues/Impairments (Mobility) Gait belt and non-skid socks donned.  -MG               User Key  (r) = Recorded By, (t) = Taken By, (c) = Cosigned By      Initials Name Provider Type    MG Stephanie Stern, PT Physical Therapist                   Mobility       Row Name 08/05/24 1357          Bed Mobility    Bed Mobility supine-sit;sit-supine  -MG     Supine-Sit [place] (Bed Mobility) standby assist  -MG     Sit-Supine [place] (Bed Mobility) standby assist  -MG     Assistive Device (Bed Mobility) head of bed elevated;bed rails  -MG       Row Name 08/05/24 135          Sit-Stand Transfer    Sit-Stand [place] (Transfers) standby assist;verbal cues  -MG     Assistive Device (Sit-Stand Transfers) walker, front-wheeled  -MG     Comment, (Sit-Stand Transfer) Cues for hand placement. No dizziness on standing. x1 EOB, x1 commode.  -MG       Row Name 08/05/24 1357          Gait/Stairs (Locomotion)    [place] Level (Gait) contact guard;standby assist;verbal cues  -MG     Assistive Device (Gait) walker, front-wheeled  -MG     Distance in Feet (Gait) 100  + 12' x2  -MG     Deviations/Abnormal Patterns (Gait) gait speed decreased;antalgic;stride length decreased  -MG     Bilateral Gait Deviations heel strike decreased  -MG     Left Sided Gait Deviations weight shift ability decreased  -MG     Comment, (Gait/Stairs) No knee buckling, dizziness, SOB or LOB. Step-to, but able to progress to step-through. On return to  room pt requested to use the bathroom, so additional 12' x2 was performed.  -MG       Row Name 08/05/24 1357          Mobility    Extremity Weight-bearing Status left lower extremity  -MG     Left Lower Extremity (Weight-bearing Status) weight-bearing as tolerated (WBAT)  -MG               User Key  (r) = Recorded By, (t) = Taken By, (c) = Cosigned By      Initials Name Provider Type    MG Stephanie Stern, PT Physical Therapist                   Obj/Interventions       Row Name 08/05/24 1359          Range of Motion Comprehensive    General Range of Motion lower extremity range of motion deficits identified  -MG     Comment, General Range of Motion RLE, L hip/ankle WFL. L knee AROM 3-80.  -MG       Row Name 08/05/24 1359          Strength Comprehensive (MMT)    General Manual Muscle Testing (MMT) Assessment lower extremity strength deficits identified  -MG     Comment, General Manual Muscle Testing (MMT) Assessment Typical generalized post-op weakness. Grossly 4+/5.  -MG       Row Name 08/05/24 1359          Motor Skills    Therapeutic Exercise other (see comments)  TKA protocol ther-ex x10. Edu to perform additional set later this PM.  -MG       Row Name 08/05/24 1359          Balance    Comment, Balance Supervision to Mod-I for sitting balance. SBA w/ RW for standing. Independent w/ pericare.  -MG       Row Name 08/05/24 1355          Sensory Assessment (Somatosensory)    Sensory Assessment (Somatosensory) sensation intact  -MG               User Key  (r) = Recorded By, (t) = Taken By, (c) = Cosigned By      Initials Name Provider Type    MG Stephanie Stern, PT Physical Therapist                   Goals/Plan       Row Name 08/05/24 7535          Bed Mobility Goal 1 (PT)    Activity/Assistive Device (Bed Mobility Goal 1, PT) sit to supine/supine to sit  -MG     [place] Level/Cues Needed (Bed Mobility Goal 1, PT) modified independence  -MG     Time Frame (Bed Mobility Goal 1, PT) long term goal (LTG);3 days  -MG        Row Name 08/05/24 1435          Transfer Goal 1 (PT)    Activity/Assistive Device (Transfer Goal 1, PT) sit-to-stand/stand-to-sit  -MG     [place] Level/Cues Needed (Transfer Goal 1, PT) modified independence  -MG     Time Frame (Transfer Goal 1, PT) long term goal (LTG);3 days  -MG       Row Name 08/05/24 1435          Gait Training Goal 1 (PT)    Activity/Assistive Device (Gait Training Goal 1, PT) gait (walking locomotion)  -MG     [place] Level (Gait Training Goal 1, PT) modified independence  -MG     Distance (Gait Training Goal 1, PT) 150  -MG     Time Frame (Gait Training Goal 1, PT) long term goal (LTG);3 days  -MG       Row Name 08/05/24 1435          ROM Goal 1 (PT)    ROM Goal 1 (PT) Surgical Knee ROM: 3-85  -MG     Time Frame (ROM Goal 1, PT) long-term goal (LTG);3 days  -MG       Row Name 08/05/24 1435          Stairs Goal 1 (PT)    Activity/Assistive Device (Stairs Goal 1, PT) stairs, all skills  -MG     [place] Level/Cues Needed (Stairs Goal 1, PT) contact guard required  -MG     Number of Stairs (Stairs Goal 1, PT) 2  -MG     Time Frame (Stairs Goal 1, PT) 3 days  -MG       Row Name 08/05/24 1435          Therapy Assessment/Plan (PT)    Planned Therapy Interventions (PT) balance training;bed mobility training;gait training;home exercise program;ROM (range of motion);patient/family education;stair training;strengthening;stretching;transfer training;motor coordination training;neuromuscular re-education  -MG               User Key  (r) = Recorded By, (t) = Taken By, (c) = Cosigned By      Initials Name Provider Type    MG Stephanie Stern, PT Physical Therapist                   Clinical Impression       Row Name 08/05/24 1400          Pain    Pretreatment Pain Rating 4/10  -MG     Posttreatment Pain Rating 5/10  -MG     Pain Location - Side/Orientation Left  -MG     Pain Location incisional  -MG     Pain Location - knee  -MG     Pain Intervention(s) Medication (See  MAR);Ambulation/increased activity;Elevated;Repositioned;Cold pack;Rest  -MG       Row Name 08/05/24 1400          Plan of Care Review    Plan of Care Reviewed With patient;spouse  -MG     Progress improving  -MG     Outcome Evaluation Pt is a 65 y/o F POD0 L TKA. Pt reports being independent at baseline without use of AD, owns a RW, and lives with her  in a house w/ 2 MIGUEL and BHR. Today, pt was SBA for bed mobility, STS, tolieting (independent w/ pericare), and ambulation total of 124'. Pt demos typical post-op antaglic mechanics, but was able to progress to a step-through pattern. No knee buckling, SOB, dizziness or LOB. Pt performed TKA protocol ther-ex for 10 reps demoing L knee AROM of 3-80deg. Pt edu on HEP, activity recs, and safety/sequencing w/ ambulation; she v/u. Pt will benefit from skilled PT services to address her typical post-op impaired gait, strength, ROM and endurance. SBAR w/ RN following session. Rec home w/ HH PT at [place].  -MG       Row Name 08/05/24 1400          Therapy Assessment/Plan (PT)    Rehab Potential (PT) good, to achieve stated therapy goals  -MG     Criteria for Skilled Interventions Met (PT) meets criteria  -MG     Therapy Frequency (PT) daily  -MG       Row Name 08/05/24 1400          Vital Signs    Pretreatment Heart Rate (beats/min) 102  -MG     Pre SpO2 (%) 95  -MG     O2 Delivery Pre Treatment room air  -MG     O2 Delivery Intra Treatment room air  -MG     Post SpO2 (%) 97  -MG     O2 Delivery Post Treatment room air  -MG     Pre Patient Position Supine  -MG     Intra Patient Position Standing  -MG     Post Patient Position Supine  -MG       Row Name 08/05/24 1400          Positioning and Restraints    Pre-Treatment Position in bed  -MG     Post Treatment Position bed  -MG     In Bed call light within reach;notified nsg;supine;fowlers;encouraged to call for assist;exit alarm on;with family/caregiver;with nsg;side rails up x2;SCD pump applied;LLE elevated  -MG                User Key  (r) = Recorded By, (t) = Taken By, (c) = Cosigned By      Initials Name Provider Type    Stephanie Valero, PT Physical Therapist                   Outcome Measures       Row Name 08/05/24 1435 08/05/24 1035       How much help from another person do you currently need...    Turning from your back to your side while in flat bed without using bedrails? 4  -MG 3  -SH    Moving from lying on back to sitting on the side of a flat bed without bedrails? 3  -MG 3  -SH    Moving to and from a bed to a chair (including a wheelchair)? 3  -MG 3  -SH    Standing up from a chair using your arms (e.g., wheelchair, bedside chair)? 3  -MG 3  -SH    Climbing 3-5 steps with a railing? 3  -MG 2  -SH    To walk in hospital room? 3  -MG 3  -SH    AM-PAC 6 Clicks Score (PT) 19  -MG 17  -SH    Highest Level of Mobility Goal 6 --> Walk 10 steps or more  -MG 5 --> Static standing  -SH              User Key  (r) = Recorded By, (t) = Taken By, (c) = Cosigned By      Initials Name Provider Type    Stephanie Valero, PT Physical Therapist     Dominique Harley RN Registered Nurse                                 Physical Therapy Education       Title: PT OT SLP Therapies (Done)       Topic: Physical Therapy (Done)       Point: Mobility training (Done)       Learning Progress Summary             Patient Acceptance, E,TB,D, VU,DU by MG at 8/5/2024 1435                         Point: Home exercise program (Done)       Learning Progress Summary             Patient Acceptance, E,TB,D, VU,DU by  at 8/5/2024 1435                         Point: Body mechanics (Done)       Learning Progress Summary             Patient Acceptance, E,TB,D, VU,DU by MG at 8/5/2024 1435                         Point: Precautions (Done)       Learning Progress Summary             Patient Acceptance, E,TB,D, VU,DU by  at 8/5/2024 1435                                         User Key       Initials Effective Dates Name Provider Type Idalmis HUNTLEY  05/24/22 -  Stephanie Stern, PT Physical Therapist PT                  PT Recommendation and Plan  Planned Therapy Interventions (PT): balance training, bed mobility training, gait training, home exercise program, ROM (range of motion), patient/family education, stair training, strengthening, stretching, transfer training, motor coordination training, neuromuscular re-education  Plan of Care Reviewed With: patient, spouse  Progress: improving  Outcome Evaluation: Pt is a 65 y/o F POD0 L TKA. Pt reports being independent at baseline without use of AD, owns a RW, and lives with her  in a house w/ 2 MIGUEL and BHR. Today, pt was SBA for bed mobility, STS, tolieting (independent w/ pericare), and ambulation total of 124'. Pt demos typical post-op antaglic mechanics, but was able to progress to a step-through pattern. No knee buckling, SOB, dizziness or LOB. Pt performed TKA protocol ther-ex for 10 reps demoing L knee AROM of 3-80deg. Pt edu on HEP, activity recs, and safety/sequencing w/ ambulation; she v/u. Pt will benefit from skilled PT services to address her typical post-op impaired gait, strength, ROM and endurance. SBAR w/ RN following session. Rec home w/ HH PT at [place].     Time Calculation:         PT Charges       Row Name 08/05/24 1436             Time Calculation    Start Time 1353  -MG      Stop Time 1415  -MG      Time Calculation (min) 22 min  -MG      PT Received On 08/05/24  -MG      PT - Next Appointment 08/06/24  -MG      PT Goal Re-Cert Due Date 08/19/24  -MG         Time Calculation- PT    Total Timed Code Minutes- PT 10 minute(s)  -MG                User Key  (r) = Recorded By, (t) = Taken By, (c) = Cosigned By      Initials Name Provider Type    MG Stephanie Stern, PT Physical Therapist                  Therapy Charges for Today       Code Description Service Date Service Provider Modifiers Qty    98194291975 HC PT EVAL MOD COMPLEXITY 2 8/5/2024 Stephanie Stern, PT GP 1    37220529486 HC PT  THERAPEUTIC ACT EA 15 MIN 8/5/2024 Stephanie Stern, PT GP 1            PT G-Codes  AM-PAC 6 Clicks Score (PT): 19  PT Discharge Summary  Anticipated Discharge Disposition (PT): home with home health, home with assist    Stephanie Stern, PT  8/5/2024

## 2024-08-05 NOTE — CASE MANAGEMENT/SOCIAL WORK
Discharge Planning Assessment  [facility]     Patient Name: Christie Hollis  MRN: [id number]  Today's Date: 8/5/2024    Admit Date: 8/5/2024    Plan: Home w/ caretenders , family to transport   Discharge Needs Assessment    No documentation.                  Discharge Plan       Row Name 08/05/24 1316       Plan    Plan Home w/ caretenders , family to transport    Plan Comments CCP spoke with patient and spouse at bedside regarding discharge plan. Patient confirms plan is to return home w/ caretenders . She states her family will be able to transport her home. She has a walker and cane if needed. She denies any further dc needs. DUGLAS SHARPE                  Continued Care and Services - Admitted Since 8/5/2024       Home Medical Care Coordination complete.      Service Provider Request Status Selected Services Address Phone Fax Patient Preferred    [facility], [address], [place] [facility] [id number] [phone] --                  Expected Discharge Date and Time       Expected Discharge Date Expected Discharge Time    Aug 6, 2024            Demographic Summary    No documentation.                  Functional Status    No documentation.                  Psychosocial    No documentation.                  Abuse/Neglect    No documentation.                  Legal    No documentation.                  Substance Abuse    No documentation.                  Patient Forms    No documentation.                     DUGLAS Mackenzie

## 2024-08-06 VITALS
HEART RATE: 93 BPM | BODY MASS INDEX: 33.32 KG/M2 | RESPIRATION RATE: 16 BRPM | HEIGHT: 63 IN | SYSTOLIC BLOOD PRESSURE: 153 MMHG | TEMPERATURE: 99.3 F | OXYGEN SATURATION: 94 % | WEIGHT: 188.05 LBS | DIASTOLIC BLOOD PRESSURE: 67 MMHG

## 2024-08-06 PROBLEM — D62 POSTOPERATIVE ANEMIA DUE TO ACUTE BLOOD LOSS: Status: ACTIVE | Noted: 2024-08-06

## 2024-08-06 LAB
ANION GAP SERPL CALCULATED.3IONS-SCNC: 5.1 MMOL/L (ref 5–15)
BUN SERPL-MCNC: 8 MG/DL (ref 8–23)
BUN/CREAT SERPL: 11.6 (ref 7–25)
CALCIUM SPEC-SCNC: 8.2 MG/DL (ref 8.6–10.5)
CHLORIDE SERPL-SCNC: 106 MMOL/L (ref 98–107)
CO2 SERPL-SCNC: 24.9 MMOL/L (ref 22–29)
CREAT SERPL-MCNC: 0.69 MG/DL (ref 0.57–1)
DEPRECATED RDW RBC AUTO: 39.3 FL (ref 37–54)
EGFRCR SERPLBLD CKD-EPI 2021: 96.5 ML/MIN/1.73
ERYTHROCYTE [DISTWIDTH] IN BLOOD BY AUTOMATED COUNT: 12.7 % (ref 12.3–15.4)
GLUCOSE BLDC GLUCOMTR-MCNC: 130 MG/DL (ref 70–130)
GLUCOSE SERPL-MCNC: 144 MG/DL (ref 65–99)
HCT VFR BLD AUTO: 27.9 % (ref 34–46.6)
HGB BLD-MCNC: 9 G/DL (ref 12–15.9)
MCH RBC QN AUTO: 27.5 PG (ref 26.6–33)
MCHC RBC AUTO-ENTMCNC: 32.3 G/DL (ref 31.5–35.7)
MCV RBC AUTO: 85.3 FL (ref 79–97)
PLATELET # BLD AUTO: 263 10*3/MM3 (ref 140–450)
PMV BLD AUTO: 8.9 FL (ref 6–12)
POTASSIUM SERPL-SCNC: 4.2 MMOL/L (ref 3.5–5.2)
RBC # BLD AUTO: 3.27 10*6/MM3 (ref 3.77–5.28)
SODIUM SERPL-SCNC: 136 MMOL/L (ref 136–145)
WBC NRBC COR # BLD AUTO: 9.36 10*3/MM3 (ref 3.4–10.8)

## 2024-08-06 PROCEDURE — 80048 BASIC METABOLIC PNL TOTAL CA: CPT | Performed by: ORTHOPAEDIC SURGERY

## 2024-08-06 PROCEDURE — 85027 COMPLETE CBC AUTOMATED: CPT | Performed by: ORTHOPAEDIC SURGERY

## 2024-08-06 PROCEDURE — 82948 REAGENT STRIP/BLOOD GLUCOSE: CPT

## 2024-08-06 PROCEDURE — 97530 THERAPEUTIC ACTIVITIES: CPT

## 2024-08-06 RX ORDER — FERROUS GLUCONATE 324(38)MG
324 TABLET ORAL
Qty: 30 TABLET | Refills: 0 | Status: SHIPPED | OUTPATIENT
Start: 2024-08-06

## 2024-08-06 RX ADMIN — FAMOTIDINE 40 MG: 20 TABLET, FILM COATED ORAL at 08:27

## 2024-08-06 RX ADMIN — OXYCODONE AND ACETAMINOPHEN 2 TABLET: 7.5; 325 TABLET ORAL at 09:53

## 2024-08-06 RX ADMIN — CITALOPRAM 20 MG: 20 TABLET, FILM COATED ORAL at 08:27

## 2024-08-06 RX ADMIN — MELOXICAM 15 MG: 15 TABLET ORAL at 08:27

## 2024-08-06 RX ADMIN — ASPIRIN 81 MG: 81 TABLET, COATED ORAL at 08:27

## 2024-08-06 RX ADMIN — OXYCODONE AND ACETAMINOPHEN 2 TABLET: 7.5; 325 TABLET ORAL at 05:51

## 2024-08-06 RX ADMIN — VALSARTAN 160 MG: 160 TABLET, FILM COATED ORAL at 08:27

## 2024-08-06 NOTE — THERAPY DISCHARGE NOTE
Patient Name: Christie Hollis  : 1960    MRN: [id number]                              Today's Date: 2024       Admit Date: 2024    Visit Dx: No diagnosis found.  Patient Active Problem List   Diagnosis    Status post knee replacement    Type 2 diabetes mellitus, without long-term current use of insulin    HTN (hypertension)    Carotid artery stenosis    Hyperlipidemia    GERD without esophagitis    BUD (obstructive sleep apnea)     Past Medical History:   Diagnosis Date    Anxiety     Arthritis     Bilateral carotid artery stenosis     2018    Diabetes mellitus     without complications    GERD (gastroesophageal reflux disease)     Hyperlipidemia     Hypertension     Right knee pain     Sleep apnea     NO MACHINE     Past Surgical History:   Procedure Laterality Date    BRACHIOPLASTY Bilateral     COLONOSCOPY      LAPAROSCOPIC GASTRIC BANDING  2009    TOTAL KNEE ARTHROPLASTY Right 10/16/2023    Procedure: RIGHT TOTAL KNEE ARTHROPLASTY WITH CORI ROBOT;  Surgeon: Vini Irizarry II, MD;  [place] [facility] [place] [facility] [place]  Service: Robotics - Ortho;  Laterality: Right;      General Information       Row Name 2431          Physical Therapy Time and Intention    Document Type therapy note (daily note)  -EF     Mode of Treatment individual therapy;physical therapy  -EF       Row Name 2431          General Information    Existing Precautions/Restrictions fall  -EF     Barriers to Rehab none identified  -EF       Row Name 24          Cognition    Orientation Status (Cognition) oriented x 4  -EF       Row Name 24          Safety Issues, Functional Mobility    Impairments Affecting Function (Mobility) endurance/activity tolerance;pain;range of motion (ROM);strength  -EF               User Key  (r) = Recorded By, (t) = Taken By, (c) = Cosigned By      Initials Name Provider Type    Marci Vincent PT Physical Therapist                   Mobility        Row Name 08/06/24 0932          Bed Mobility    Supine-Sit Reading (Bed Mobility) independent  -EF     Sit-Supine Reading (Bed Mobility) independent  -EF       Row Name 08/06/24 0932          Sit-Stand Transfer    Sit-Stand Reading (Transfers) modified independence  -EF     Assistive Device (Sit-Stand Transfers) walker, front-wheeled  -EF       Row Name 08/06/24 0932          Gait/Stairs (Locomotion)    Reading Level (Gait) modified independence  -EF     Assistive Device (Gait) walker, front-wheeled  -EF     Distance in Feet (Gait) 175  -EF     Deviations/Abnormal Patterns (Gait) nabor decreased;left sided deviations;antalgic  -EF     Left Sided Gait Deviations heel strike decreased;weight shift ability decreased  -EF     Reading Level (Stairs) verbal cues;stand by assist  -EF     Handrail Location (Stairs) both sides  -EF     Number of Steps (Stairs) 4  -EF     Ascending Technique (Stairs) step-to-step  -EF     Descending Technique (Stairs) step-to-step  -EF     Comment, (Gait/Stairs) Cues for gait sequencing on stairs. No LOB or safety concerns with mobility.  -EF               User Key  (r) = Recorded By, (t) = Taken By, (c) = Cosigned By      Initials Name Provider Type    EF Marci Randolph, PT Physical Therapist                   Obj/Interventions       Row Name 08/06/24 0933          Motor Skills    Therapeutic Exercise --  L TKA protocol x 15 reps  -EF               User Key  (r) = Recorded By, (t) = Taken By, (c) = Cosigned By      Initials Name Provider Type    EF Marci Randolph, PT Physical Therapist                   Goals/Plan    No documentation.                  Clinical Impression       Row Name 08/06/24 0933          Pain    Pretreatment Pain Rating 6/10  -EF     Posttreatment Pain Rating 6/10  -EF     Pain Location - Side/Orientation Left  -EF     Pain Location incisional  -EF     Pain Location - knee  -EF     Pain Intervention(s) Repositioned;Cold  applied;Medication (See MAR)  -EF       Row Name 08/06/24 0933          Plan of Care Review    Plan of Care Reviewed With patient  -EF     Progress improving  -EF     Outcome Evaluation Pt demos excellent progress with strength and mobility as evidenced by tolerance of increased activity today. Pt performed bed mobility and transfers independently. Pt ambulated 175' independently with rwx. Pt safely negotiated 4 stairs with rails and SBA. Pt demos no LOB or safety concerns with mobility. Pt completed TKA exercises with supervision and verbalizes understanding of activity recommendations for home. No further acute PT concerns. Pt planning to DC home today and continue with HH PT. No problems anticipated.  -EF       Row Name 08/06/24 0933          Positioning and Restraints    Pre-Treatment Position in bed  -EF     Post Treatment Position bed  -EF     In Bed fowlers;call light within reach;encouraged to call for assist;with family/caregiver;notified nsg;exit alarm on  -EF               User Key  (r) = Recorded By, (t) = Taken By, (c) = Cosigned By      Initials Name Provider Type    Marci Vincent, PT Physical Therapist                   Outcome Measures       Row Name 08/06/24 0935 08/06/24 0827       How much help from another person do you currently need...    Turning from your back to your side while in flat bed without using bedrails? 4  -EF 4  -SH    Moving from lying on back to sitting on the side of a flat bed without bedrails? 4  -EF 4  -SH    Moving to and from a bed to a chair (including a wheelchair)? 4  -EF 4  -SH    Standing up from a chair using your arms (e.g., wheelchair, bedside chair)? 4  -EF 3  -SH    Climbing 3-5 steps with a railing? 3  -EF 3  -SH    To walk in hospital room? 4  -EF 3  -SH    AM-PAC 6 Clicks Score (PT) 23  -EF 21  -SH    Highest Level of Mobility Goal 7 --> Walk 25 feet or more  -EF 6 --> Walk 10 steps or more  -SH              User Key  (r) = Recorded By, (t) = Taken By,  (c) = Cosigned By      Initials Name Provider Type     Marci Randolph, PT Physical Therapist    Dominique Hernandez, RN Registered Nurse                  Physical Therapy Education       Title: PT OT SLP Therapies (Resolved)       Topic: Physical Therapy (Resolved)       Point: Mobility training (Resolved)       Learning Progress Summary             Patient Acceptance, E,TB, VU,DU by  at 8/6/2024 0935    Acceptance, E,TB,D, VU,DU by  at 8/5/2024 1435                         Point: Home exercise program (Resolved)       Learning Progress Summary             Patient Acceptance, E,TB, VU,DU by  at 8/6/2024 0935    Acceptance, E,TB,D, VU,DU by  at 8/5/2024 1435                         Point: Body mechanics (Resolved)       Learning Progress Summary             Patient Acceptance, E,TB,D, VU,DU by  at 8/5/2024 1435                         Point: Precautions (Resolved)       Learning Progress Summary             Patient Acceptance, E,TB,D, VU,DU by  at 8/5/2024 1435                                         User Key       Initials Effective Dates Name Provider Type Discipline     05/31/24 -  Marci Randolph, PT Physical Therapist PT     05/24/22 -  Stephanie Stern, PT Physical Therapist PT                  PT Recommendation and Plan     Plan of Care Reviewed With: patient  Progress: improving  Outcome Evaluation: Pt demos excellent progress with strength and mobility as evidenced by tolerance of increased activity today. Pt performed bed mobility and transfers independently. Pt ambulated 175' independently with rwx. Pt safely negotiated 4 stairs with rails and SBA. Pt demos no LOB or safety concerns with mobility. Pt completed TKA exercises with supervision and verbalizes understanding of activity recommendations for home. No further acute PT concerns. Pt planning to DC home today and continue with HH PT. No problems anticipated.     Time Calculation:         PT Charges       Row Name 08/06/24 9017              Time Calculation    Start Time 0913  -EF      Stop Time 0928  -EF      Time Calculation (min) 15 min  -EF      PT Received On 08/06/24  -EF         Time Calculation- PT    Total Timed Code Minutes- PT 15 minute(s)  -EF         Timed Charges    24980 - PT Therapeutic Activity Minutes 15  -EF         Total Minutes    Timed Charges Total Minutes 15  -EF       Total Minutes 15  -EF                User Key  (r) = Recorded By, (t) = Taken By, (c) = Cosigned By      Initials Name Provider Type    EF Marci Randolph, PT Physical Therapist                  Therapy Charges for Today       Code Description Service Date Service Provider Modifiers Qty    [id number]  PT THERAPEUTIC ACT EA 15 MIN 8/6/2024 Marci Randolph, PT GP 1            PT G-Codes  AM-PAC 6 Clicks Score (PT): 23    PT Discharge Summary  Anticipated Discharge Disposition (PT): home with home health    Marci Randolph, PT  8/6/2024

## 2024-08-06 NOTE — DISCHARGE INSTRUCTIONS
Total Knee  Discharge Instructions  Dr. JOE Siddiqui” Juan C II  [phone]    INCISION CARE  Wash your hands prior to dressing changes  JUSTINA Wound VAC: Postoperatively you had a JUSTINA Wound Vac placed on the incision. This was placed under sterile conditions in the operating room. It remains in place for 7 days postoperatively. After 7 days, the entire dressing must be removed, including all of the sticky adhesive. The dressing and battery pack provide gentle suction to the incision and provide several benefits over a traditional dressing:  It maintains the sterile environment of the OR and reduces the risk of infection  The suction removes unwanted buildup of blood/hematoma under the skin to reduce swelling  The suction also promotes fresh blood supply to the skin and soft tissue to speed up healing  The postoperative scar is reduced in size  Showering is permitted immediately after surgery, but the battery pack must be protected or removed during the shower.   After 7 days the JUSTINA Wound Vac is removed. If there is no drainage, no dressing is required. If there is some scant drainage a dry bandage can be applied and changed daily until seen in the office or until the drainage stops.   No creams or ointments to the incisions until 4 weeks post op.  Do not touch or pick at the incision  Check incision every day and notify surgeon immediately if any of the following signs or symptoms are seen:  Increase in redness  Increase in swelling around the incision and of the entire extremity  Increase in pain  NEW drainage or oozing from the incision  Pulling apart of the edges of the incision  Increase in overall body temperature (greater than 100.4 degrees)  Zip-Line: your incision was closed with a state of the art device.   Is a non-invasive and easy to use wound closure device that replaces sutures, staples and glue for surgical incisions  It minimizes scarring and eliminating “railroad” marks that come with staples or  sutures  It makes removal as atraumatic as peeling off a bandage  Can be removed at home or by a physical therapist or nurse at 14 days postoperatively    ACTIVITIES  Exercises:  Physical therapy will begin immediately while in the hospital. Patients going to a nursing home will get therapy as part of their care at the SNU/SNF facility. Patients going home may also have a therapist come to the house to help them mobilize until they can safely get to an outpatient therapy facility.  Elevate the affected leg most of the day during the first week post operatively. Caution must be taken to avoid pillow placement directly under the heel of the leg, as this can cause pressure ulcers even with a soft pillow. All pillows and blankets should be placed underneath of the thigh and calf so that the heel is free-floating.  Use cold packs for 20-30 minutes approximately 5 times per day.  You should perform the daily stretching and strengthening exercises as taught by the therapist as often as possible. This can be done many times a day.  Full weight bearing is allowed after surgery. It will be sore/painful to put weight on the leg, but this will help the bone to heal and prevent complications such as pneumonia, bed sores and blood clots. Mobilization is vital to the recovery process.  Activities of Daily Living:  No tub baths, hot tubs, or swimming pools for 4 weeks.  May shower and let water run over the incision immediately after surgery. The battery pack of the JUSTINA Wound Vac must be protected or removed while in the shower. After the JUSTINA is removed 7 days after surgery showering is permitted as long as there is no drainage from the wound.     Restrictions  Weight: It is ok to allow full weight bearing after surgery. Weight on the leg actually quickens the recovery process. While it will be sore/painful to put weight on the leg, it is safe to do so. Hip replacement after hip fracture has a much slower recover process. It can  take months to heal fully from a hip fracture and patients even make some slow benefits up to a year afterwards.   Driving: Many patients have questions about when it is safe to return to driving. The answer is that this is extremely variable. It depends on the extent of the surgery, as well as how quickly you heal. Certainly left leg surgeries make returning to driving easier while right leg surgeries require more extensive rehabilitation before driving can be safe. Until you can press down on the brake hard, and are off of all narcotics, driving is not permitted. Your surgeon cannot “clear” you to return to driving, only you can make the decision when you feel it is safe.    Medications  Anticoagulants: After upper extremity surgery most patients do not require an anticoagulant unless you have another injury that will be keeping you from mobilizing. Lower extremity surgery typically does require use of an anticoagulation medicine.   IF YOU HAD LOWER EXTREMITY SURGERY AND ARE NOT DISCHARGED HOME WITH ANY ANTICOAGULANT MEDICINE YOU SHOULD TAKE ASPIRIN 325mg DAILY FOR 30 DAYS POSTOPERATIVELY.  If you are discharged home with an anticoagulant such as Aspirin, Xarelto, Eliquis, Coumadin, or Lovenox, follow these simple instructions:   Notify surgeon immediately if any elio bleeding is noted in the urine, stool, emesis, or from the nose or the incision. Blood in the stool will often appear as black rather than red. Blood in urine may appear as pink. Blood in emesis may appear as brown/black like coffee grounds.  You will need to apply pressure for longer periods of time to any cuts or abrasions to stop bleeding  Avoid alcohol while taking anticoagulants  Most anticoagulants are to be taken for 30 days postoperatively. After this time, you may stop using them unless instructed otherwise.   If you were already taking an anticoagulant (commonly Aspirin, Coumadin, or Plavix) you will likely be resuming your normal dose  postoperatively and will be continuing that medication at the discretion of the prescribing physician.  Stool Softeners: You will be at greater risk of constipation after surgery due to being less mobile and the pain medications.  Take stool softeners as needed. Over the counter Colace 100 mg 1-2 capsules twice daily can be taken.  If stools become too loose or too frequent, please decreases the dosage or stop the stool softener.  If constipation occurs despite use of stool softeners, you are to continue the stool softeners and add a laxative (Milk of Magnesia 1 ounce daily as needed)  Drink plenty of fluids, and eat fruits and vegetables during your recovery time. Getting up and mobilizing will help the bowels to recover their regular function, as will weaning off of all narcotics when the pain becomes tolerable.  Pain Medications: Utilized after surgery are narcotics. This is some general information about these medications.  CLASSIFICATION: Pain medications are called Opioids and are narcotics  LEGALITIES: It is illegal to share narcotics with others  DRIVING: it is illegal to drive while under the influence of narcotics. Doing so is a DUI.  POTENTIAL SIDE EFFECTS: nausea, vomiting, itching, dizziness, drowsiness, dry mouth, constipation, and difficulty urinating.  POTENTIAL ADVERSE EFFECTS:  Opioid tolerance can develop with use of pain medications and this simply means that it requires more and more of the medication to control pain. However, this is seen more in patients that use opioids for longer periods of time.  Opioid dependence can develop with use of Opioids. People with opioid dependence will experience withdrawal symptoms upon cessation of the medication.  Opioid addiction can develop with use of Opioids. The incidence of this is very unlikely in patients who take the medications as ordered and stop the medications as instructed.  Opioid overdose can be dangerous, but is unlikely when the medication  is taken as ordered and stopped when ordered. It is important not to mix opioids with alcohol as this can lead to over sedation and respiratory difficulty.  DOSAGE:  After the initial surgical pain begins to resolve, you may begin to decrease the pain medication. By the end of a few weeks, you should be off of pain medications.  Refills will not be given by the office during evening hours, on weekends, or after 6 weeks post-op. You are responsible for weaning off of pain medication. You can increase the time between narcotic pills, taking one every 4 then 6 then 8 hours and so on.  To seek refills on pain medications during the initial 6-week post-operative period, you must call the office to request the refill. The office will then notify you when to  the prescription. DO NOT wait until you are out of the medication to request a refill. Prescriptions will not be filled over the weekend and depending on the schedule, it may take a couple days for the prescription to be available. Someone will have to pick the prescription in person at the office.    FOLLOW-UP VISITS  You will need to follow up in the office with your surgeon in 3 weeks, or as instructed elsewhere in your discharge paperwork. Please call this number [phone] to schedule this appointment. If you are going to an SNF/SNU facility, they will arrange for you to follow up in the office.  If you have any concerns or suspected complications prior to your follow up visit, please call the office. Do not wait until your appointment time if you suspect complications. These will need to be addressed in the office promptly.      Vini Irizarry II, MD  Orthopaedic Surgery  [facility]

## 2024-08-06 NOTE — PLAN OF CARE
Goal Outcome Evaluation:  Plan of Care Reviewed With: patient        Progress: improving  Outcome Evaluation: VSS, RA, SL, up with assist, of 1, voiding per BRP, pain tolerable, JUSTINA in place with green light blinking, educated on BP and glucose monitoring, home today

## 2024-08-06 NOTE — PROGRESS NOTES
Name: Christie Hollis ADMIT: 2024   : 1960  PCP: Pallavi Chaudhry MD    MRN: [id number] LOS: 0 days   AGE/SEX: 64 y.o. female  ROOM: [facility]     Subjective   Subjective   Referring Provider: Dr. Irizarry  Reason for Follow-up: HTN, Type 2 DM, GERD, BUD    No acute events. Patient denies new complaints. Taking PO without nausea or vomiting. No CP or dyspnea.  is at bedside.    Objective   Objective   Vital Signs  Temp:  [98.8 °F (37.1 °C)-99.3 °F (37.4 °C)] 99.3 °F (37.4 °C)  Heart Rate:  [82-96] 93  Resp:  [16-17] 16  BP: (104-153)/(61-70) 153/67  SpO2:  [94 %-98 %] 94 %  on   ;   Device (Oxygen Therapy): room air  Body mass index is 33.85 kg/m².  Physical Exam  Vitals and nursing note reviewed.   Constitutional:       General: She is not in acute distress.     Appearance: She is not toxic-appearing or diaphoretic.   HENT:      Head: Normocephalic and atraumatic.      Nose: Nose normal.      Mouth/Throat:      Mouth: Mucous membranes are moist.      Pharynx: Oropharynx is clear.   Eyes:      Conjunctiva/sclera: Conjunctivae normal.      Pupils: Pupils are equal, round, and reactive to light.   Cardiovascular:      Rate and Rhythm: Normal rate and regular rhythm.      Pulses: Normal pulses.   Pulmonary:      Effort: Pulmonary effort is normal.      Breath sounds: Normal breath sounds.   Abdominal:      General: Bowel sounds are normal.      Palpations: Abdomen is soft.      Tenderness: There is no abdominal tenderness.   Musculoskeletal:         General: No swelling or tenderness.      Cervical back: Neck supple.      Comments: Left knee in surgical dressing c/d/i   Skin:     General: Skin is warm and dry.      Capillary Refill: Capillary refill takes less than 2 seconds.   Neurological:      General: No focal deficit present.      Mental Status: She is alert and oriented to person, place, and time.   Psychiatric:         Mood and Affect: Mood normal.         Behavior: Behavior normal.        Results Review     I reviewed the patient's new clinical results.  Results from last 7 days   Lab Units 08/06/24  0354 07/30/24  1411   WBC 10*3/mm3 9.36 7.20   HEMOGLOBIN g/dL 9.0* 12.0   PLATELETS 10*3/mm3 263 366     Results from last 7 days   Lab Units 08/06/24  0354 07/30/24  1411   SODIUM mmol/L 136 136   POTASSIUM mmol/L 4.2 4.1   CHLORIDE mmol/L 106 102   CO2 mmol/L 24.9 24.2   BUN mg/dL 8 7*   CREATININE mg/dL 0.69 0.68   GLUCOSE mg/dL 144* 144*   EGFR mL/min/1.73 96.5 96.8     Results from last 7 days   Lab Units 07/30/24  1411   ALBUMIN g/dL 4.2   BILIRUBIN mg/dL 0.2   ALK PHOS U/L 55   AST (SGOT) U/L 16   ALT (SGPT) U/L 13     Results from last 7 days   Lab Units 08/06/24  0354 07/30/24  1411   CALCIUM mg/dL 8.2* 9.4   ALBUMIN g/dL  --  4.2       Glucose   Date/Time Value Ref Range Status   08/06/2024 0721 130 70 - 130 mg/dL Final   08/05/2024 2049 183 (H) 70 - 130 mg/dL Final   08/05/2024 1621 188 (H) 70 - 130 mg/dL Final   08/05/2024 0603 124 70 - 130 mg/dL Final       No radiology results for the last day    I have personally reviewed all medications:  Scheduled Medications  aspirin, 81 mg, Oral, Q12H  citalopram, 20 mg, Oral, Daily  famotidine, 40 mg, Oral, Daily  insulin lispro, 2-9 Units, Subcutaneous, 4x Daily AC & at Bedtime  meloxicam, 15 mg, Oral, Daily  valsartan, 160 mg, Oral, Daily    Infusions  lactated ringers, 9 mL/hr, Last Rate: 9 mL/hr (08/05/24 0627)  sodium chloride, 100 mL/hr, Last Rate: 100 mL/hr (08/05/24 1218)    Diet  Diet: Diabetic; Consistent Carbohydrate; Fluid Consistency: Thin (IDDSI 0)    I have personally reviewed:  [x]  Laboratory   []  Microbiology   [x]  Radiology   []  EKG/Telemetry  []  Cardiology/Vascular   []  Pathology    []  Records    Assessment/Plan     Active Hospital Problems    Diagnosis  POA    **Status post knee replacement [Z96.659]  Not Applicable    Postoperative anemia due to acute blood loss [D62]  Yes    Hyperlipidemia [E78.5]  Yes    GERD  without esophagitis [K21.9]  Yes    BUD (obstructive sleep apnea) [G47.33]  Yes    HTN (hypertension) [I10]  Yes    Type 2 diabetes mellitus, without long-term current use of insulin [E11.9]  Yes      Resolved Hospital Problems   No resolved problems to display.   Osteoarthritis of the Left Knee  - s/p total knee arthroplasty 8/5/24 with Dr. Irizarry  - management per primary team     Postoperative Anemia due to Acute Blood Loss  - expected, no indication for transfusion  - will prescribe iron pills at discharge  - follow up with PCP in a couple weeks    Type 2 DM  - BG acceptable  - hold metformin and actos while admitted-resume at discharge  - cover with ssi/hypoglycemia protocol while she is here     HTN  - BG is slightly elevated  - continue valsartan  - no significant issues on BMP     GERD  - symptoms controlled  - continue famotidine     BUD  - not on CPAP  - encourage pulmonary toilet and supplement oxygen as needed      Disposition per primary team. I have no objection to discharge.    Rico Jacobson MD  [facility]  08/06/24  11:13 EDT    Portions of this text have been copied and I have reviewed them. They are accurate as of 8/6/2024

## 2024-08-06 NOTE — PLAN OF CARE
Goal Outcome Evaluation:  Plan of Care Reviewed With: patient                Goals met for discharge.

## 2024-08-06 NOTE — PLAN OF CARE
Goal Outcome Evaluation:  Plan of Care Reviewed With: patient        Progress: improving  Outcome Evaluation: Pt demos excellent progress with strength and mobility as evidenced by tolerance of increased activity today. Pt performed bed mobility and transfers independently. Pt ambulated 175' independently with rwx. Pt safely negotiated 4 stairs with rails and SBA. Pt demos no LOB or safety concerns with mobility. Pt completed TKA exercises with supervision and verbalizes understanding of activity recommendations for home. No further acute PT concerns. Pt planning to DC home today and continue with HH PT. No problems anticipated.      Anticipated Discharge Disposition (PT): home with home health

## 2024-08-06 NOTE — CASE MANAGEMENT/SOCIAL WORK
Case Management Discharge Note      Final Note: Home with [facility]         Selected Continued Care - Discharged on 8/6/2024 Admission date: 8/5/2024 - Discharge disposition: Home or Self Care      Destination    No services have been selected for the patient.                Durable Medical Equipment    No services have been selected for the patient.                Dialysis/Infusion    No services have been selected for the patient.                Home Medical Care Coordination complete.      Service Provider Selected Services Address Phone Fax Patient Preferred    [facility] [address], [address], [place] [address] [phone] --              Therapy    No services have been selected for the patient.                Community Resources    No services have been selected for the patient.                Community & DME    No services have been selected for the patient.                         Final Discharge Disposition Code: 06 - home with home health care

## 2024-09-13 ENCOUNTER — HOSPITAL ENCOUNTER (OUTPATIENT)
Dept: MAMMOGRAPHY | Facility: HOSPITAL | Age: 64
Discharge: HOME | End: 2024-09-13
Admitting: INTERNAL MEDICINE
Payer: MEDICARE

## 2024-09-13 DIAGNOSIS — Z12.31 SCREENING MAMMOGRAM, ENCOUNTER FOR: ICD-10-CM

## 2024-09-13 PROCEDURE — 77063 BREAST TOMOSYNTHESIS BI: CPT

## 2024-09-13 PROCEDURE — 77067 SCR MAMMO BI INCL CAD: CPT

## 2024-09-24 PROBLEM — Z12.11 SCREEN FOR COLON CANCER: Status: ACTIVE | Noted: 2024-06-12

## 2024-11-07 ENCOUNTER — ANESTHESIA EVENT (OUTPATIENT)
Dept: GASTROENTEROLOGY | Facility: HOSPITAL | Age: 64
End: 2024-11-07
Payer: MEDICARE

## 2024-11-07 ENCOUNTER — ANESTHESIA (OUTPATIENT)
Dept: GASTROENTEROLOGY | Facility: HOSPITAL | Age: 64
End: 2024-11-07
Payer: MEDICARE

## 2024-11-07 ENCOUNTER — HOSPITAL ENCOUNTER (OUTPATIENT)
Facility: HOSPITAL | Age: 64
Setting detail: HOSPITAL OUTPATIENT SURGERY
Discharge: HOME | End: 2024-11-07
Attending: SURGERY | Admitting: SURGERY
Payer: MEDICARE

## 2024-11-07 VITALS
SYSTOLIC BLOOD PRESSURE: 141 MMHG | RESPIRATION RATE: 16 BRPM | DIASTOLIC BLOOD PRESSURE: 72 MMHG | WEIGHT: 181.4 LBS | BODY MASS INDEX: 32.14 KG/M2 | HEART RATE: 75 BPM | OXYGEN SATURATION: 99 % | HEIGHT: 63 IN

## 2024-11-07 DIAGNOSIS — Z12.11 SCREEN FOR COLON CANCER: ICD-10-CM

## 2024-11-07 LAB — GLUCOSE BLDC GLUCOMTR-MCNC: 100 MG/DL (ref 70–130)

## 2024-11-07 PROCEDURE — 82948 REAGENT STRIP/BLOOD GLUCOSE: CPT

## 2024-11-07 PROCEDURE — 25010000002 LIDOCAINE 2% SOLUTION: Performed by: NURSE ANESTHETIST, CERTIFIED REGISTERED

## 2024-11-07 PROCEDURE — 25810000003 LACTATED RINGERS PER 1000 ML: Performed by: SURGERY

## 2024-11-07 PROCEDURE — S0260 H&P FOR SURGERY: HCPCS | Performed by: SURGERY

## 2024-11-07 PROCEDURE — 25010000002 GLYCOPYRROLATE 0.2 MG/ML SOLUTION: Performed by: NURSE ANESTHETIST, CERTIFIED REGISTERED

## 2024-11-07 PROCEDURE — 25010000002 PROPOFOL 1000 MG/100ML EMULSION: Performed by: NURSE ANESTHETIST, CERTIFIED REGISTERED

## 2024-11-07 PROCEDURE — 25810000003 LACTATED RINGERS PER 1000 ML: Performed by: NURSE ANESTHETIST, CERTIFIED REGISTERED

## 2024-11-07 PROCEDURE — 45380 COLONOSCOPY AND BIOPSY: CPT | Performed by: SURGERY

## 2024-11-07 PROCEDURE — 88305 TISSUE EXAM BY PATHOLOGIST: CPT | Performed by: SURGERY

## 2024-11-07 RX ORDER — LIDOCAINE HYDROCHLORIDE 20 MG/ML
INJECTION, SOLUTION INFILTRATION; PERINEURAL AS NEEDED
Status: DISCONTINUED | OUTPATIENT
Start: 2024-11-07 | End: 2024-11-07 | Stop reason: SURG

## 2024-11-07 RX ORDER — SODIUM CHLORIDE 0.9 % (FLUSH) 0.9 %
10 SYRINGE (ML) INJECTION AS NEEDED
Status: DISCONTINUED | OUTPATIENT
Start: 2024-11-07 | End: 2024-11-07 | Stop reason: HOSPADM

## 2024-11-07 RX ORDER — SODIUM CHLORIDE, SODIUM LACTATE, POTASSIUM CHLORIDE, CALCIUM CHLORIDE 600; 310; 30; 20 MG/100ML; MG/100ML; MG/100ML; MG/100ML
1000 INJECTION, SOLUTION INTRAVENOUS ONCE
Status: COMPLETED | OUTPATIENT
Start: 2024-11-07 | End: 2024-11-07

## 2024-11-07 RX ORDER — GLYCOPYRROLATE 0.2 MG/ML
INJECTION INTRAMUSCULAR; INTRAVENOUS AS NEEDED
Status: DISCONTINUED | OUTPATIENT
Start: 2024-11-07 | End: 2024-11-07 | Stop reason: SURG

## 2024-11-07 RX ORDER — PROPOFOL 10 MG/ML
INJECTION, EMULSION INTRAVENOUS AS NEEDED
Status: DISCONTINUED | OUTPATIENT
Start: 2024-11-07 | End: 2024-11-07 | Stop reason: SURG

## 2024-11-07 RX ORDER — SODIUM CHLORIDE, SODIUM LACTATE, POTASSIUM CHLORIDE, CALCIUM CHLORIDE 600; 310; 30; 20 MG/100ML; MG/100ML; MG/100ML; MG/100ML
INJECTION, SOLUTION INTRAVENOUS CONTINUOUS PRN
Status: DISCONTINUED | OUTPATIENT
Start: 2024-11-07 | End: 2024-11-07 | Stop reason: SURG

## 2024-11-07 RX ADMIN — PROPOFOL INJECTABLE EMULSION 180 MCG/KG/MIN: 10 INJECTION, EMULSION INTRAVENOUS at 13:25

## 2024-11-07 RX ADMIN — SODIUM CHLORIDE, POTASSIUM CHLORIDE, SODIUM LACTATE AND CALCIUM CHLORIDE 1000 ML: 600; 310; 30; 20 INJECTION, SOLUTION INTRAVENOUS at 12:28

## 2024-11-07 RX ADMIN — LIDOCAINE HYDROCHLORIDE 80 MG: 20 INJECTION, SOLUTION INFILTRATION; PERINEURAL at 13:24

## 2024-11-07 RX ADMIN — GLYCOPYRROLATE 0.2 MG: 0.2 INJECTION INTRAMUSCULAR; INTRAVENOUS at 13:23

## 2024-11-07 RX ADMIN — PROPOFOL INJECTABLE EMULSION 100 MG: 10 INJECTION, EMULSION INTRAVENOUS at 13:24

## 2024-11-07 RX ADMIN — SODIUM CHLORIDE, POTASSIUM CHLORIDE, SODIUM LACTATE AND CALCIUM CHLORIDE: 600; 310; 30; 20 INJECTION, SOLUTION INTRAVENOUS at 12:28

## 2024-11-07 NOTE — ANESTHESIA POSTPROCEDURE EVALUATION
Patient: Christie Hollis    Procedure Summary       Date: 11/07/24 Room / Location: [facility] ENDOSCOPY 1 /  [facility]    Anesthesia Start: 1321 Anesthesia Stop: 1352    Procedure: COLONOSCOPY TO CECUM WITH COLD POLYPECTOMY Diagnosis:       Screen for colon cancer      (Screen for colon cancer [Z12.11])    Surgeons: Ivan Brito MD Provider: Patrice Peng MD    Anesthesia Type: MAC ASA Status: 3            Anesthesia Type: MAC    Vitals  Vitals Value Taken Time   /72 11/07/24 1409   Temp     Pulse 75 11/07/24 1409   Resp 16 11/07/24 1409   SpO2 99 % 11/07/24 1409           Post Anesthesia Care and Evaluation    Patient location during evaluation: PACU  Patient participation: complete - patient participated  Level of consciousness: awake and alert  Pain management: adequate    Airway patency: patent  Anesthetic complications: No anesthetic complications    Cardiovascular status: acceptable  Respiratory status: acceptable  Hydration status: acceptable    Comments: --------------------            11/07/24               1409     --------------------   BP:       141/72     Pulse:      75       Resp:       16       SpO2:      99%      --------------------

## 2024-11-07 NOTE — OP NOTE
Colonoscopy Procedure Note  Christie Hollis  1960  Date of Procedure: 11/07/24    Pre-operative Diagnosis:    Encounter for screening colonoscopy    Post-operative Diagnosis:  Cecal polyp    Procedure: Colonoscopy to cecum with cold forcep biopsy of cecal polyp    Findings/Treatments:   Tiny sessile cecal polyp       Recommendations:   Follow-up results of pathology.  The office will call within the next  3-10 days with a final recommendation.    Surgeon: Ivan Brito MD    Anesthetic: MAC per Patrice Peng MD      Procedure Details:    MAC anesthesia was induced.  A digital rectal exam was performed along with visual inspection of the anus. The 180 Colonoscope was inserted into the rectum and advanced to the cecum, with relative ease.  Cecum was identified by the appendiceal orifice and the ileocecal valve and photographed for documentation.       A careful inspection was made as the scope was withdrawn, including a retroflexed view of the rectum.  There was a tiny sessile cecal polyp which was removed in its entirety with the cold forcep.  Hemostasis was observed.  No other mucosal abnormalities of the entire examined colon or rectum were visualized. Retroflexion in the rectum revealed no abnormalities. Prep quality was excellent.      Ivan Brito M.D.  General, Endoscopic, and Robotic Surgery  [facility]    [address], [address]  [place], [place], [address]  P: [phone]  F: [phone]

## 2024-11-07 NOTE — H&P
SURGERY  Christie Hollis  1960    11/07/24    HPI: 64 y.o. female here for screening colonoscopy.  She reports a prior colonoscopy in 2013 which was without polyps.  She subsequently had a negative Cologuard 5 years ago.  Denies any family history of colon cancer.    Past Medical History:   Diagnosis Date    Anxiety     Arthritis     Bilateral carotid artery stenosis     6/1/2018    Diabetes mellitus     without complications    GERD (gastroesophageal reflux disease)     Hyperlipidemia     Hypertension     Right knee pain     Screen for colon cancer 6/12/2024    Sleep apnea     NO MACHINE       Past Surgical History:   Procedure Laterality Date    BRACHIOPLASTY Bilateral     COLONOSCOPY  2013    LAPAROSCOPIC GASTRIC BANDING  01/09/2009    TOTAL KNEE ARTHROPLASTY Right 10/16/2023    Procedure: RIGHT TOTAL KNEE ARTHROPLASTY WITH CORI ROBOT;  Surgeon: Vini Irizarry II, MD;  Location: [facility] OR [facility];  Service: Robotics - Ortho;  Laterality: Right;    TOTAL KNEE ARTHROPLASTY Left 8/5/2024    Procedure: LEFT TOTAL KNEE ARTHROPLASTY CORI ROBOT;  Surgeon: Vini Irizarry II, MD;  Location: [facility] OR [facility];  Service: Robotics - Ortho;  Laterality: Left;       is allergic to sulfa antibiotics.       Medication List        ASK your doctor about these medications      albuterol sulfate  (90 Base) MCG/ACT inhaler  Commonly known as: PROVENTIL HFA;VENTOLIN HFA;PROAIR HFA     atorvastatin 10 MG tablet  Commonly known as: LIPITOR     azelastine 0.1 % nasal spray  Commonly known as: ASTELIN     cholecalciferol 25 MCG (1000 UT) tablet  Commonly known as: VITAMIN D3     citalopram 20 MG tablet  Commonly known as: CeleXA     CoQ-10 200 MG capsule     ferrous gluconate 324 MG tablet  Commonly known as: FERGON  Take 1 tablet by mouth Daily With Breakfast.     KRILL OIL PO     lansoprazole 15 MG capsule  Commonly known as: PREVACID     Loratadine 10 MG capsule     meloxicam 15 MG tablet  Commonly known as:  MOBIC     metFORMIN 500 MG tablet  Commonly known as: GLUCOPHAGE     MUCINEX D PO     Ozempic (2 MG/DOSE) 8 MG/3ML solution pen-injector  Generic drug: Semaglutide (2 MG/DOSE)     pioglitazone 45 MG tablet  Commonly known as: ACTOS     polyethylene glycol 17 g packet  Commonly known as: MIRALAX     rosuvastatin 20 MG tablet  Commonly known as: CRESTOR     valsartan 160 MG tablet  Commonly known as: DIOVAN     VITAMIN B COMPLEX PO     Vitamin E 180 MG (400 UNIT) capsule capsule              Family History   Problem Relation Age of Onset    Cancer Mother     Cancer Father     Aortic aneurysm Father     Malig Hyperthermia Neg Hx        Social History     Socioeconomic History    Marital status:    Tobacco Use    Smoking status: Never    Smokeless tobacco: Never   Vaping Use    Vaping status: Never Used   Substance and Sexual Activity    Alcohol use: Yes     Comment: OCCASIONALLY    Drug use: Never    Sexual activity: Defer       Vitals:    11/07/24 1219   BP: 127/66   Pulse: 71   Resp: 14   SpO2: 100%       Body mass index is 32.13 kg/m².    Physical Exam    General: No acute distress  Lungs: No labored breathing, Pulse oximetry on room air is 100%.  Heart: RRR  Abdo: Soft  Mental:  Awake, alert, and oriented      Assessment/Plan  Encounter for Screening Colonoscopy  Proceed with screening colonoscopy.  Risk, benefits discussed including risk of perforation, bleeding.    Ivan Brito MD  13:20 EST

## 2024-11-07 NOTE — DISCHARGE INSTRUCTIONS
For the next 24 hours patient needs to be with a responsible adult.    For 24 hours DO NOT drive, operate machinery, appliances, drink alcohol, make important decisions or sign legal documents.    Start with a light or bland diet if you are feeling sick to your stomach otherwise advance to regular diet as tolerated.    Follow recommendations on procedure report if provided by your doctor.    Call Dr Brito for problems [phone]    Problems may include but not limited to: large amounts of bleeding, trouble breathing, repeated vomiting, severe unrelieved pain, fever or chills.

## 2024-11-07 NOTE — ANESTHESIA PREPROCEDURE EVALUATION
Anesthesia Evaluation     Patient summary reviewed and Nursing notes reviewed                Airway   Mallampati: III  Dental      Pulmonary    (+) ,sleep apnea  Cardiovascular     ECG reviewed  Rhythm: regular  Rate: normal    (+) hypertension, hyperlipidemia,  carotid artery disease (50% less than) carotid bilateral      Neuro/Psych  (+) psychiatric history Anxiety and Depression  GI/Hepatic/Renal/Endo    (+) obesity, GERD, diabetes mellitus type 2    Musculoskeletal     Abdominal    Substance History   (+) alcohol use     OB/GYN negative ob/gyn ROS         Other   arthritis,                 Anesthesia Plan    ASA 3     MAC     (Ozempic last injected 10/31/24)  intravenous induction     Anesthetic plan, risks, benefits, and alternatives have been provided, discussed and informed consent has been obtained with: patient.    CODE STATUS:

## 2024-11-08 LAB
CYTO UR: NORMAL
LAB AP CASE REPORT: NORMAL
PATH REPORT.FINAL DX SPEC: NORMAL
PATH REPORT.GROSS SPEC: NORMAL

## 2024-11-11 ENCOUNTER — TRANSCRIBE ORDERS (OUTPATIENT)
Dept: ADMINISTRATIVE | Facility: HOSPITAL | Age: 64
End: 2024-11-11
Payer: MEDICARE

## 2024-11-11 DIAGNOSIS — Z13.6 SCREENING FOR CARDIOVASCULAR CONDITION: Primary | ICD-10-CM

## 2025-01-27 ENCOUNTER — HOSPITAL ENCOUNTER (OUTPATIENT)
Dept: CARDIOLOGY | Facility: HOSPITAL | Age: 65
Discharge: HOME | End: 2025-01-27

## 2025-01-27 ENCOUNTER — HOSPITAL ENCOUNTER (OUTPATIENT)
Dept: CT IMAGING | Facility: HOSPITAL | Age: 65
Discharge: HOME | End: 2025-01-27

## 2025-01-27 DIAGNOSIS — Z13.6 SCREENING FOR CARDIOVASCULAR CONDITION: ICD-10-CM

## 2025-01-27 DIAGNOSIS — I65.23 BILATERAL CAROTID ARTERY STENOSIS: Primary | ICD-10-CM

## 2025-01-27 LAB
BH CV VAS SCREENING CAROTID CCA LEFT: 70 CM/SEC
BH CV VAS SCREENING CAROTID CCA RIGHT: 69 CM/SEC
BH CV VAS SCREENING CAROTID ICA LEFT: 84 CM/SEC
BH CV VAS SCREENING CAROTID ICA RIGHT: 210 CM/SEC
BH CV VAS STUDY COMMENTS THYROID NODULE RT 1ST MEASURE: 1.1 CM
BH CV VAS STUDY COMMENTS THYROID NODULE RT 2ND MEASUR: 0.95 CM
BH CV XLRA MEAS - MID AO DIAM: 1.7 CM
BH CV XLRA MEAS - PAD LEFT ABI PT: 1.2
BH CV XLRA MEAS - PAD LEFT ARM: 123 MMHG
BH CV XLRA MEAS - PAD LEFT LEG PT: 152 MMHG
BH CV XLRA MEAS - PAD RIGHT ABI PT: 1.21
BH CV XLRA MEAS - PAD RIGHT ARM: 127 MMHG
BH CV XLRA MEAS - PAD RIGHT LEG PT: 154 MMHG
BH CV XLRA MEAS LEFT DIST CCA EDV: -15.5 CM/SEC
BH CV XLRA MEAS LEFT DIST CCA PSV: -69.6 CM/SEC
BH CV XLRA MEAS LEFT ICA/CCA RATIO: 1.2
BH CV XLRA MEAS LEFT PROX ICA EDV: -22.4 CM/SEC
BH CV XLRA MEAS LEFT PROX ICA PSV: -83.9 CM/SEC
BH CV XLRA MEAS RIGHT DIST CCA EDV: 17.4 CM/SEC
BH CV XLRA MEAS RIGHT DIST CCA PSV: 69 CM/SEC
BH CV XLRA MEAS RIGHT ICA/CCA RATIO: 3
BH CV XLRA MEAS RIGHT PROX ICA EDV: -31.8 CM/SEC
BH CV XLRA MEAS RIGHT PROX ICA PSV: -210 CM/SEC

## 2025-01-27 PROCEDURE — 75571 CT HRT W/O DYE W/CA TEST: CPT

## 2025-01-27 PROCEDURE — 93799 UNLISTED CV SVC/PROCEDURE: CPT

## 2025-02-17 ENCOUNTER — TRANSCRIBE ORDERS (OUTPATIENT)
Age: 65
End: 2025-02-17
Payer: MEDICARE

## 2025-02-17 DIAGNOSIS — E11.319 TYPE 2 DIABETES MELLITUS WITH RETINOPATHY WITHOUT MACULAR EDEMA, UNSPECIFIED LATERALITY, UNSPECIFIED RETINOPATHY SEVERITY, UNSPECIFIED WHETHER LONG TERM INSULIN USE: Primary | ICD-10-CM

## 2025-02-20 ENCOUNTER — TELEPHONE (OUTPATIENT)
Dept: CARDIOLOGY | Facility: CLINIC | Age: 65
End: 2025-02-20

## 2025-02-20 ENCOUNTER — OFFICE VISIT (OUTPATIENT)
Dept: CARDIOLOGY | Facility: CLINIC | Age: 65
End: 2025-02-20
Payer: MEDICARE

## 2025-02-20 VITALS
HEIGHT: 63 IN | SYSTOLIC BLOOD PRESSURE: 140 MMHG | BODY MASS INDEX: 33.98 KG/M2 | WEIGHT: 191.8 LBS | DIASTOLIC BLOOD PRESSURE: 70 MMHG | HEART RATE: 68 BPM

## 2025-02-20 DIAGNOSIS — I65.23 BILATERAL CAROTID ARTERY STENOSIS: ICD-10-CM

## 2025-02-20 DIAGNOSIS — I25.10 CORONARY ARTERY CALCIFICATION: Primary | ICD-10-CM

## 2025-02-20 RX ORDER — ASPIRIN 81 MG/1
81 TABLET ORAL DAILY
Start: 2025-02-20

## 2025-02-20 RX ORDER — ASPIRIN 325 MG
325 TABLET, DELAYED RELEASE (ENTERIC COATED) ORAL DAILY
COMMUNITY
End: 2025-02-20

## 2025-02-20 NOTE — PROGRESS NOTES
[facility]      Patient Name: Christie Hollis  :1960  Age: 64 y.o.  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 female past medical history of diabetes, hypertension, mixed hyperlipidemia who presents for abnormal coronary calcium score.  Screening performed with calcium score of 683.7.  Patient has known carotid atherosclerosis followed with screening ultrasound every few years.  She has good functional capacity with no limiting symptoms.  She specifically denies angina.  No orthopnea, PND or edema.  No palpitations, dizziness or syncope.  Blood pressure is borderline elevated today in clinic at 140/70 mmHg.  She has no family history of coronary artery disease or sudden cardiac death.  She is a lifelong non-smoker who drinks rarely and denies illicit drug use.  EKG today shows sinus rhythm with borderline low voltage but no ischemic repolarization abnormalities.      The following portions of the patient's history were reviewed and updated as appropriate: allergies, current medications, past family history, past medical history, past social history, past surgical history and problem list.      Review of Systems   Constitutional: Negative for chills and fever.   HENT:  Negative for hoarse voice and sore throat.    Eyes:  Negative for double vision and photophobia.   Cardiovascular:  Negative for chest pain, leg swelling, near-syncope, orthopnea, palpitations, paroxysmal nocturnal dyspnea and syncope.   Respiratory:  Negative for cough and wheezing.    Skin:  Negative for poor wound healing and rash.   Musculoskeletal:  Negative for arthritis and joint swelling.   Gastrointestinal:  Negative for bloating, abdominal pain, hematemesis and hematochezia.   Neurological:  Negative for dizziness  "and focal weakness.   Psychiatric/Behavioral:  Negative for depression and suicidal ideas.        OBJECTIVE:   Vital Signs  Vitals:    02/20/25 1003   BP: 140/70   Pulse: 68     Estimated body mass index is 33.98 kg/m² as calculated from the following:    Height as of this encounter: 160 cm (63\").    Weight as of this encounter: 87 kg (191 lb 12.8 oz).    Vitals reviewed.   Constitutional:       Appearance: Healthy appearance. Not in distress.   Neck:      Vascular: No JVR. JVD normal.   Pulmonary:      Effort: Pulmonary effort is normal.      Breath sounds: Normal breath sounds. No wheezing. No rhonchi. No rales.   Chest:      Chest wall: Not tender to palpatation.   Cardiovascular:      PMI at left midclavicular line. Normal rate. Regular rhythm. Normal S1. Normal S2.       Murmurs: There is no murmur.      No gallop.  No click. No rub.   Pulses:     Intact distal pulses.   Edema:     Peripheral edema absent.   Abdominal:      General: Bowel sounds are normal.      Palpations: Abdomen is soft.      Tenderness: There is no abdominal tenderness.   Musculoskeletal: Normal range of motion.         General: No tenderness. Skin:     General: Skin is warm and dry.   Neurological:      General: No focal deficit present.      Mental Status: Alert and oriented to person, place and time.           ECG 12 Lead    Date/Time: 2/20/2025 10:12 AM  Performed by: Pedro Mosquera Jr., MD    Authorized by: Pedro Mosquera Jr., MD  Comparison: compared with previous ECG from 7/30/2024  Similar to previous ECG  Rhythm: sinus rhythm  Other findings: low voltage    Clinical impression: non-specific ECG             BUN   Date Value Ref Range Status   08/06/2024 8 8 - 23 mg/dL Final     Creatinine   Date Value Ref Range Status   08/06/2024 0.69 0.57 - 1.00 mg/dL Final     Potassium   Date Value Ref Range Status   08/06/2024 4.2 3.5 - 5.2 mmol/L Final     ALT (SGPT)   Date Value Ref Range Status   07/30/2024 13 1 - 33 U/L Final     AST (SGOT) "   Date Value Ref Range Status   07/30/2024 16 1 - 32 U/L Final           ASSESSMENT:     64-year-old female presents for evaluation of coronary artery calcification      PLAN OF CARE:     Coronary artery calcification -patient has known vascular atherosclerotic disease.  She is already on aspirin and statin.  I recommend decreasing the aspirin dose to 81 mg.  We will request latest lipid profile from PCP.  She has good functional capacity and no indication for ischemic workup at this time.  Hypertension -blood pressure log to be sent in after 1 week.  Mixed hyperlipidemia -request latest FLP from PCP  Diabetes    Return to clinic 6 months             Discharge Medications            Accurate as of February 20, 2025 10:12 AM. If you have any questions, ask your nurse or doctor.                Continue These Medications        Instructions Start Date   albuterol sulfate  (90 Base) MCG/ACT inhaler  Commonly known as: PROVENTIL HFA;VENTOLIN HFA;PROAIR HFA   2 puffs, Inhalation      aspirin 325 MG EC tablet   325 mg, Daily      atorvastatin 10 MG tablet  Commonly known as: LIPITOR   10 mg, Daily      azelastine 0.1 % nasal spray  Commonly known as: ASTELIN   2 sprays, 2 Times Daily PRN      cholecalciferol 25 MCG (1000 UT) tablet  Commonly known as: VITAMIN D3   1 tablet, Daily      citalopram 20 MG tablet  Commonly known as: CeleXA   1 tablet, Daily      CoQ-10 200 MG capsule   1 Capful, Daily      ferrous gluconate 324 MG tablet  Commonly known as: FERGON   324 mg, Oral, Daily With Breakfast      KRILL OIL PO   1 tablet, Daily      lansoprazole 15 MG capsule  Commonly known as: PREVACID   1 capsule, Daily      Loratadine 10 MG capsule   10 mg, Nightly      MAGNESIUM PO   Daily      meloxicam 15 MG tablet  Commonly known as: MOBIC   1 tablet, Daily      metFORMIN 500 MG tablet  Commonly known as: GLUCOPHAGE   2 tablets, 2 Times Daily With Meals      MUCINEX D PO   As Needed      NEXIUM PO   Take  by mouth.       Ozempic (2 MG/DOSE) 8 MG/3ML solution pen-injector  Generic drug: Semaglutide (2 MG/DOSE)   2 mg, Weekly      pioglitazone 45 MG tablet  Commonly known as: ACTOS   1 tablet, Daily      polyethylene glycol 17 g packet  Commonly known as: MIRALAX   17 g, Daily PRN      rosuvastatin 20 MG tablet  Commonly known as: CRESTOR   1 tablet, Nightly      valsartan 160 MG tablet  Commonly known as: DIOVAN   1 tablet, Daily      VITAMIN B COMPLEX PO   1 tablet, Daily      Vitamin E 180 MG (400 UNIT) capsule capsule   1 capsule, Daily               Thank you for allowing me to participate in the care of your patient,      Sincerely,   Pedro Mosquera MD  [facility]  02/20/25  10:12 EST

## 2025-02-20 NOTE — TELEPHONE ENCOUNTER
I called Dr. Chaudhry's office but had to leave a voicemail message for pt's most recent lipid profile to be faxed to our office./darryn

## 2025-03-10 ENCOUNTER — TELEPHONE (OUTPATIENT)
Facility: HOSPITAL | Age: 65
End: 2025-03-10
Payer: MEDICARE

## 2025-03-10 NOTE — TELEPHONE ENCOUNTER
Josue Covarrubias, you saw the patient in July and wanted a repeat ctd in 1 year. Her pcp ordered a ctd screening and it was done in Jan. 2025. The patient is wanting to know if she still needs to come in this July for another ultrasound or wait a little longer since the screening was done two months ago. Thank you.    2021

## 2025-04-24 ENCOUNTER — TRANSCRIBE ORDERS (OUTPATIENT)
Dept: ADMINISTRATIVE | Facility: HOSPITAL | Age: 65
End: 2025-04-24
Payer: MEDICARE

## 2025-04-24 DIAGNOSIS — E04.1 NONTOXIC SINGLE THYROID NODULE: Primary | ICD-10-CM

## 2025-05-05 ENCOUNTER — HOSPITAL ENCOUNTER (OUTPATIENT)
Dept: ULTRASOUND IMAGING | Facility: HOSPITAL | Age: 65
Discharge: HOME | End: 2025-05-05
Admitting: INTERNAL MEDICINE
Payer: MEDICARE

## 2025-05-05 DIAGNOSIS — E04.1 NONTOXIC SINGLE THYROID NODULE: ICD-10-CM

## 2025-05-05 PROCEDURE — 76536 US EXAM OF HEAD AND NECK: CPT

## 2025-07-31 ENCOUNTER — TRANSCRIBE ORDERS (OUTPATIENT)
Dept: ADMINISTRATIVE | Facility: HOSPITAL | Age: 65
End: 2025-07-31
Payer: MEDICARE

## 2025-07-31 DIAGNOSIS — Z12.31 ENCOUNTER FOR SCREENING MAMMOGRAM FOR BREAST CANCER: Primary | ICD-10-CM

## 2025-08-20 ENCOUNTER — OFFICE VISIT (OUTPATIENT)
Dept: CARDIOLOGY | Age: 65
End: 2025-08-20
Payer: MEDICARE

## 2025-08-20 VITALS
DIASTOLIC BLOOD PRESSURE: 70 MMHG | HEIGHT: 63 IN | OXYGEN SATURATION: 97 % | HEART RATE: 78 BPM | BODY MASS INDEX: 32.96 KG/M2 | WEIGHT: 186 LBS | SYSTOLIC BLOOD PRESSURE: 122 MMHG

## 2025-08-20 DIAGNOSIS — I31.39 PERICARDIAL EFFUSION: ICD-10-CM

## 2025-08-20 DIAGNOSIS — R01.1 HEART MURMUR: Primary | ICD-10-CM

## 2025-08-20 DIAGNOSIS — I25.10 CORONARY ARTERY CALCIFICATION: ICD-10-CM

## 2025-08-20 PROCEDURE — 1159F MED LIST DOCD IN RCRD: CPT | Performed by: NURSE PRACTITIONER

## 2025-08-20 PROCEDURE — 93000 ELECTROCARDIOGRAM COMPLETE: CPT | Performed by: NURSE PRACTITIONER

## 2025-08-20 PROCEDURE — 3074F SYST BP LT 130 MM HG: CPT | Performed by: NURSE PRACTITIONER

## 2025-08-20 PROCEDURE — 3078F DIAST BP <80 MM HG: CPT | Performed by: NURSE PRACTITIONER

## 2025-08-20 PROCEDURE — 1160F RVW MEDS BY RX/DR IN RCRD: CPT | Performed by: NURSE PRACTITIONER

## 2025-08-20 PROCEDURE — 99214 OFFICE O/P EST MOD 30 MIN: CPT | Performed by: NURSE PRACTITIONER

## 2025-08-29 ENCOUNTER — HOSPITAL ENCOUNTER (OUTPATIENT)
Dept: CARDIOLOGY | Facility: HOSPITAL | Age: 65
Discharge: HOME | End: 2025-08-29
Payer: MEDICARE

## 2025-08-29 VITALS
SYSTOLIC BLOOD PRESSURE: 140 MMHG | HEIGHT: 63 IN | OXYGEN SATURATION: 96 % | HEART RATE: 79 BPM | WEIGHT: 186 LBS | DIASTOLIC BLOOD PRESSURE: 62 MMHG | BODY MASS INDEX: 32.96 KG/M2

## 2025-08-29 DIAGNOSIS — I31.39 PERICARDIAL EFFUSION: ICD-10-CM

## 2025-08-29 DIAGNOSIS — R01.1 HEART MURMUR: ICD-10-CM

## 2025-08-29 LAB
AORTIC ARCH: 2.7 CM
AORTIC DIMENSIONLESS INDEX: 0.44 (DI)
ASCENDING AORTA: 3.4 CM
AV MEAN PRESS GRAD SYS DOP V1V2: 10.6 MMHG
AV VMAX SYS DOP: 228 CM/SEC
BH CV ECHO LEFT ATRIAL STRAIN: 29.2 %
BH CV ECHO LEFT VENTRICLE GLOBAL LONGITUDINAL STRAIN: -22.2 %
BH CV ECHO MEAS - ACS: 1.31 CM
BH CV ECHO MEAS - AI P1/2T: 572.8 MSEC
BH CV ECHO MEAS - AO MAX PG: 20.8 MMHG
BH CV ECHO MEAS - AO ROOT AREA (BSA CORRECTED): 1.7 CM2
BH CV ECHO MEAS - AO ROOT DIAM: 3.2 CM
BH CV ECHO MEAS - AO V2 VTI: 47 CM
BH CV ECHO MEAS - AVA(I,D): 1.41 CM2
BH CV ECHO MEAS - EDV(CUBED): 79.5 ML
BH CV ECHO MEAS - EDV(MOD-SP2): 65 ML
BH CV ECHO MEAS - EDV(MOD-SP4): 73 ML
BH CV ECHO MEAS - EF(MOD-SP2): 58.5 %
BH CV ECHO MEAS - EF(MOD-SP4): 63 %
BH CV ECHO MEAS - ESV(CUBED): 15.7 ML
BH CV ECHO MEAS - ESV(MOD-SP2): 27 ML
BH CV ECHO MEAS - ESV(MOD-SP4): 27 ML
BH CV ECHO MEAS - FS: 41.8 %
BH CV ECHO MEAS - IVS/LVPW: 0.82 CM
BH CV ECHO MEAS - IVSD: 0.9 CM
BH CV ECHO MEAS - LAT PEAK E' VEL: 7.8 CM/SEC
BH CV ECHO MEAS - LV DIASTOLIC VOL/BSA (35-75): 38.9 CM2
BH CV ECHO MEAS - LV MASS(C)D: 142.5 GRAMS
BH CV ECHO MEAS - LV MAX PG: 4.1 MMHG
BH CV ECHO MEAS - LV MEAN PG: 2 MMHG
BH CV ECHO MEAS - LV SYSTOLIC VOL/BSA (12-30): 14.4 CM2
BH CV ECHO MEAS - LV V1 MAX: 101 CM/SEC
BH CV ECHO MEAS - LV V1 VTI: 20.8 CM
BH CV ECHO MEAS - LVIDD: 4.3 CM
BH CV ECHO MEAS - LVIDS: 2.5 CM
BH CV ECHO MEAS - LVOT AREA: 3.2 CM2
BH CV ECHO MEAS - LVOT DIAM: 2.01 CM
BH CV ECHO MEAS - LVPWD: 1.1 CM
BH CV ECHO MEAS - MED PEAK E' VEL: 5.3 CM/SEC
BH CV ECHO MEAS - MV A DUR: 0.11 SEC
BH CV ECHO MEAS - MV A MAX VEL: 81.7 CM/SEC
BH CV ECHO MEAS - MV DEC SLOPE: 394.1 CM/SEC2
BH CV ECHO MEAS - MV DEC TIME: 0.21 SEC
BH CV ECHO MEAS - MV E MAX VEL: 91.5 CM/SEC
BH CV ECHO MEAS - MV E/A: 1.12
BH CV ECHO MEAS - MV MAX PG: 4.7 MMHG
BH CV ECHO MEAS - MV MEAN PG: 2.11 MMHG
BH CV ECHO MEAS - MV P1/2T: 73.6 MSEC
BH CV ECHO MEAS - MV V2 VTI: 33 CM
BH CV ECHO MEAS - MVA(P1/2T): 3 CM2
BH CV ECHO MEAS - MVA(VTI): 2.01 CM2
BH CV ECHO MEAS - PA ACC TIME: 0.11 SEC
BH CV ECHO MEAS - PA V2 MAX: 96.4 CM/SEC
BH CV ECHO MEAS - PULM A REVS DUR: 0.11 SEC
BH CV ECHO MEAS - PULM A REVS VEL: 46.3 CM/SEC
BH CV ECHO MEAS - PULM DIAS VEL: 37.7 CM/SEC
BH CV ECHO MEAS - PULM S/D: 1.43
BH CV ECHO MEAS - PULM SYS VEL: 54 CM/SEC
BH CV ECHO MEAS - QP/QS: 0.83
BH CV ECHO MEAS - RAP SYSTOLE: 3 MMHG
BH CV ECHO MEAS - RV MAX PG: 1.95 MMHG
BH CV ECHO MEAS - RV V1 MAX: 69.8 CM/SEC
BH CV ECHO MEAS - RV V1 VTI: 17.4 CM
BH CV ECHO MEAS - RVOT DIAM: 2 CM
BH CV ECHO MEAS - RVSP: 23 MMHG
BH CV ECHO MEAS - SUP REN AO DIAM: 2 CM
BH CV ECHO MEAS - SV(LVOT): 66.3 ML
BH CV ECHO MEAS - SV(MOD-SP2): 38 ML
BH CV ECHO MEAS - SV(MOD-SP4): 46 ML
BH CV ECHO MEAS - SV(RVOT): 54.8 ML
BH CV ECHO MEAS - SVI(LVOT): 35.3 ML/M2
BH CV ECHO MEAS - SVI(MOD-SP2): 20.3 ML/M2
BH CV ECHO MEAS - SVI(MOD-SP4): 24.5 ML/M2
BH CV ECHO MEAS - TAPSE (>1.6): 2.07 CM
BH CV ECHO MEAS - TR MAX PG: 20.3 MMHG
BH CV ECHO MEAS - TR MAX VEL: 225.3 CM/SEC
BH CV ECHO MEAS RV FREE WALL STRAIN: -22.8 %
BH CV ECHO MEASUREMENTS AVERAGE E/E' RATIO: 13.97
BH CV XLRA - RV BASE: 2.7 CM
BH CV XLRA - RV LENGTH: 7.5 CM
BH CV XLRA - RV MID: 2.5 CM
BH CV XLRA - TDI S': 11.3 CM/SEC
LEFT ATRIUM VOLUME INDEX: 28.1 ML/M2
LV EF BIPLANE MOD: 60.9 %
SINUS: 3 CM
STJ: 2.6 CM

## 2025-08-29 PROCEDURE — 93356 MYOCRD STRAIN IMG SPCKL TRCK: CPT

## 2025-08-29 PROCEDURE — 93306 TTE W/DOPPLER COMPLETE: CPT

## (undated) DEVICE — TUBING, SUCTION, 1/4" X 10', STRAIGHT: Brand: MEDLINE

## (undated) DEVICE — BNDG ELAS ELITE V/CLOSE 6IN 5YD LF STRL

## (undated) DEVICE — TBG PENCL TELESCP MEGADYNE SMOKE EVAC 10FT

## (undated) DEVICE — Device

## (undated) DEVICE — TRAP FLD MINIVAC MEGADYNE 100ML

## (undated) DEVICE — SOL NACL 0.9PCT 1000ML

## (undated) DEVICE — PK KN TOTL 40

## (undated) DEVICE — CONTAINER,SPECIMEN,OR STERILE,4OZ: Brand: MEDLINE

## (undated) DEVICE — GLV SURG SIGNATURE ESSENTIAL PF LTX SZ8.5

## (undated) DEVICE — COVER,MAYO STAND,STERILE: Brand: MEDLINE

## (undated) DEVICE — CANN O2 ETCO2 FITS ALL CONN CO2 SMPL A/ 7IN DISP LF

## (undated) DEVICE — DECANTER BAG 9": Brand: MEDLINE INDUSTRIES, INC.

## (undated) DEVICE — DRSNG TELFA PAD NONADH STR 1S 3X4IN

## (undated) DEVICE — PATIENT RETURN ELECTRODE, SINGLE-USE, CONTACT QUALITY MONITORING, ADULT, WITH 9FT CORD, FOR PATIENTS WEIGING OVER 33LBS. (15KG): Brand: MEGADYNE

## (undated) DEVICE — SINGLE-USE BIOPSY FORCEPS: Brand: RADIAL JAW 4

## (undated) DEVICE — SOL ISO/ALC 70PCT 4OZ

## (undated) DEVICE — SYR LUERLOK 30CC

## (undated) DEVICE — SUT VIC 2/0 CT1 36IN

## (undated) DEVICE — ADAPT CLN BIOGUARD AIR/H2O DISP

## (undated) DEVICE — SUT ETHLN 2/0 PS 18IN 585H

## (undated) DEVICE — SUT ETHIB 2 CV V37 MS/4 30IN MX69G

## (undated) DEVICE — SUCTION MAT (LOW PROFILE), 50X34: Brand: NEPTUNE

## (undated) DEVICE — NEEDLE, QUINCKE, 20GX3.5": Brand: MEDLINE

## (undated) DEVICE — THE STERILE LIGHT HANDLE COVER IS USED WITH STERIS SURGICAL LIGHTING AND VISUALIZATION SYSTEMS.

## (undated) DEVICE — GLV SURG PREMIERPRO ORTHO LTX PF SZ8.5 BRN

## (undated) DEVICE — ZIP 24 SURGICAL SKIN CLOSURE DEVICE, PSA: Brand: ZIP 24 SURGICAL SKIN CLOSURE DEVICE

## (undated) DEVICE — 450 ML BOTTLE OF 0.05% CHLORHEXIDINE GLUCONATE IN 99.95% STERILE WATER FOR IRRIGATION, USP AND APPLICATOR.: Brand: IRRISEPT ANTIMICROBIAL WOUND LAVAGE

## (undated) DEVICE — DRSNG TELFA PAD NONADH STR 1S 3X8IN

## (undated) DEVICE — LN SMPL CO2 SHTRM SD STREAM W/M LUER

## (undated) DEVICE — PREP SOL POVIDONE/IODINE BT 4OZ

## (undated) DEVICE — DUAL CUT SAGITTAL BLADE

## (undated) DEVICE — APPL CHLORAPREP HI/LITE 26ML ORNG

## (undated) DEVICE — SENSR O2 OXIMAX FNGR A/ 18IN NONSTR

## (undated) DEVICE — BNDG ELAS ELITE V/CLOSE 4IN 5YD LF STRL

## (undated) DEVICE — NON RIMMED SPEED PIN 65MM STERILE

## (undated) DEVICE — KT ORCA ORCAPOD DISP STRL

## (undated) DEVICE — PENCL SMOKE/EVAC MEGADYNE TELESCP 10FT

## (undated) DEVICE — BNDG,ELSTC,MATRIX,STRL,6"X5YD,LF,HOOK&LP: Brand: MEDLINE